# Patient Record
Sex: MALE | Race: BLACK OR AFRICAN AMERICAN | Employment: FULL TIME | URBAN - METROPOLITAN AREA
[De-identification: names, ages, dates, MRNs, and addresses within clinical notes are randomized per-mention and may not be internally consistent; named-entity substitution may affect disease eponyms.]

---

## 2019-01-29 ENCOUNTER — HOSPITAL ENCOUNTER (EMERGENCY)
Age: 39
Discharge: HOME | End: 2019-01-29
Payer: COMMERCIAL

## 2019-01-29 VITALS
WEIGHT: 270 LBS | HEIGHT: 73 IN | TEMPERATURE: 99.5 F | OXYGEN SATURATION: 93 % | HEART RATE: 69 BPM | DIASTOLIC BLOOD PRESSURE: 90 MMHG | SYSTOLIC BLOOD PRESSURE: 107 MMHG | RESPIRATION RATE: 18 BRPM | BODY MASS INDEX: 35.78 KG/M2

## 2019-01-29 DIAGNOSIS — R10.9 ABDOMINAL CRAMPING: ICD-10-CM

## 2019-01-29 DIAGNOSIS — R11.2 NAUSEA VOMITING AND DIARRHEA: Primary | ICD-10-CM

## 2019-01-29 DIAGNOSIS — R19.7 NAUSEA VOMITING AND DIARRHEA: Primary | ICD-10-CM

## 2019-01-29 LAB
ALBUMIN SERPL-MCNC: 4.4 GM/DL (ref 3.4–5)
ALP BLD-CCNC: 62 IU/L (ref 40–129)
ALT SERPL-CCNC: 32 U/L (ref 10–40)
ANION GAP SERPL CALCULATED.3IONS-SCNC: 10 MMOL/L (ref 4–16)
ANISOCYTOSIS: ABNORMAL
AST SERPL-CCNC: 24 IU/L (ref 15–37)
BASOPHILS ABSOLUTE: 0 K/CU MM
BASOPHILS RELATIVE PERCENT: 0.1 % (ref 0–1)
BILIRUB SERPL-MCNC: 0.5 MG/DL (ref 0–1)
BUN BLDV-MCNC: 13 MG/DL (ref 6–23)
CALCIUM SERPL-MCNC: 9.4 MG/DL (ref 8.3–10.6)
CHLORIDE BLD-SCNC: 101 MMOL/L (ref 99–110)
CO2: 27 MMOL/L (ref 21–32)
CREAT SERPL-MCNC: 1 MG/DL (ref 0.9–1.3)
DIFFERENTIAL TYPE: ABNORMAL
EOSINOPHILS ABSOLUTE: 0.1 K/CU MM
EOSINOPHILS RELATIVE PERCENT: 1.8 % (ref 0–3)
GFR AFRICAN AMERICAN: >60 ML/MIN/1.73M2
GFR NON-AFRICAN AMERICAN: >60 ML/MIN/1.73M2
GLUCOSE BLD-MCNC: 103 MG/DL (ref 70–99)
HCT VFR BLD CALC: 40.4 % (ref 42–52)
HEMOGLOBIN: 12.2 GM/DL (ref 13.5–18)
HYPOCHROMIA: ABNORMAL
IMMATURE NEUTROPHIL %: 0.4 % (ref 0–0.43)
LIPASE: 20 IU/L (ref 13–60)
LYMPHOCYTES ABSOLUTE: 0.8 K/CU MM
LYMPHOCYTES RELATIVE PERCENT: 11.4 % (ref 24–44)
MCH RBC QN AUTO: 20.9 PG (ref 27–31)
MCHC RBC AUTO-ENTMCNC: 30.2 % (ref 32–36)
MCV RBC AUTO: 69.1 FL (ref 78–100)
MICROCYTES: ABNORMAL
MONOCYTES ABSOLUTE: 0.6 K/CU MM
MONOCYTES RELATIVE PERCENT: 8.7 % (ref 0–4)
NUCLEATED RBC %: 0 %
OVALOCYTES: ABNORMAL
PDW BLD-RTO: 16.2 % (ref 11.7–14.9)
PLATELET # BLD: 336 K/CU MM (ref 140–440)
PLT MORPHOLOGY: ABNORMAL
PMV BLD AUTO: 9.7 FL (ref 7.5–11.1)
POLYCHROMASIA: ABNORMAL
POTASSIUM SERPL-SCNC: 3.9 MMOL/L (ref 3.5–5.1)
RAPID INFLUENZA  B AGN: NEGATIVE
RAPID INFLUENZA A AGN: NEGATIVE
RBC # BLD: 5.85 M/CU MM (ref 4.6–6.2)
SEGMENTED NEUTROPHILS ABSOLUTE COUNT: 5.2 K/CU MM
SEGMENTED NEUTROPHILS RELATIVE PERCENT: 77.6 % (ref 36–66)
SODIUM BLD-SCNC: 138 MMOL/L (ref 135–145)
TARGET CELLS: ABNORMAL
TOTAL IMMATURE NEUTOROPHIL: 0.03 K/CU MM
TOTAL NUCLEATED RBC: 0 K/CU MM
TOTAL PROTEIN: 8 GM/DL (ref 6.4–8.2)
WBC # BLD: 6.7 K/CU MM (ref 4–10.5)

## 2019-01-29 PROCEDURE — 83690 ASSAY OF LIPASE: CPT

## 2019-01-29 PROCEDURE — 80053 COMPREHEN METABOLIC PANEL: CPT

## 2019-01-29 PROCEDURE — 6360000002 HC RX W HCPCS: Performed by: PHYSICIAN ASSISTANT

## 2019-01-29 PROCEDURE — 2580000003 HC RX 258: Performed by: PHYSICIAN ASSISTANT

## 2019-01-29 PROCEDURE — 87804 INFLUENZA ASSAY W/OPTIC: CPT

## 2019-01-29 PROCEDURE — 85025 COMPLETE CBC W/AUTO DIFF WBC: CPT

## 2019-01-29 PROCEDURE — 96374 THER/PROPH/DIAG INJ IV PUSH: CPT

## 2019-01-29 PROCEDURE — 36415 COLL VENOUS BLD VENIPUNCTURE: CPT

## 2019-01-29 PROCEDURE — 96361 HYDRATE IV INFUSION ADD-ON: CPT

## 2019-01-29 PROCEDURE — 99283 EMERGENCY DEPT VISIT LOW MDM: CPT

## 2019-01-29 RX ORDER — FAMOTIDINE 20 MG/1
20 TABLET, FILM COATED ORAL 2 TIMES DAILY
Qty: 20 TABLET | Refills: 0 | Status: SHIPPED | OUTPATIENT
Start: 2019-01-29 | End: 2019-03-12

## 2019-01-29 RX ORDER — 0.9 % SODIUM CHLORIDE 0.9 %
1000 INTRAVENOUS SOLUTION INTRAVENOUS ONCE
Status: COMPLETED | OUTPATIENT
Start: 2019-01-29 | End: 2019-01-29

## 2019-01-29 RX ORDER — ONDANSETRON 2 MG/ML
4 INJECTION INTRAMUSCULAR; INTRAVENOUS EVERY 30 MIN PRN
Status: DISCONTINUED | OUTPATIENT
Start: 2019-01-29 | End: 2019-01-29 | Stop reason: HOSPADM

## 2019-01-29 RX ORDER — ONDANSETRON 4 MG/1
4 TABLET, FILM COATED ORAL EVERY 8 HOURS PRN
Qty: 10 TABLET | Refills: 0 | Status: SHIPPED | OUTPATIENT
Start: 2019-01-29 | End: 2019-03-12

## 2019-01-29 RX ORDER — DICYCLOMINE HYDROCHLORIDE 10 MG/ML
20 INJECTION INTRAMUSCULAR ONCE
Status: COMPLETED | OUTPATIENT
Start: 2019-01-29 | End: 2019-01-29

## 2019-01-29 RX ORDER — LOPERAMIDE HYDROCHLORIDE 2 MG/1
2 CAPSULE ORAL 4 TIMES DAILY PRN
Qty: 12 CAPSULE | Refills: 0 | Status: SHIPPED | OUTPATIENT
Start: 2019-01-29 | End: 2019-02-01

## 2019-01-29 RX ORDER — DICYCLOMINE HYDROCHLORIDE 10 MG/1
10 CAPSULE ORAL
Qty: 20 CAPSULE | Refills: 0 | Status: SHIPPED | OUTPATIENT
Start: 2019-01-29 | End: 2019-03-12

## 2019-01-29 RX ADMIN — DICYCLOMINE HYDROCHLORIDE 20 MG: 20 INJECTION, SOLUTION INTRAMUSCULAR at 11:21

## 2019-01-29 RX ADMIN — SODIUM CHLORIDE 1000 ML: 9 INJECTION, SOLUTION INTRAVENOUS at 11:21

## 2019-01-29 ASSESSMENT — PAIN SCALES - GENERAL: PAINLEVEL_OUTOF10: 6

## 2019-01-29 ASSESSMENT — PAIN DESCRIPTION - PAIN TYPE: TYPE: ACUTE PAIN

## 2019-01-29 ASSESSMENT — PAIN DESCRIPTION - LOCATION: LOCATION: ABDOMEN

## 2019-01-29 ASSESSMENT — PAIN DESCRIPTION - ORIENTATION: ORIENTATION: MID

## 2019-01-29 ASSESSMENT — PAIN DESCRIPTION - DESCRIPTORS: DESCRIPTORS: TIGHTNESS

## 2019-03-12 ENCOUNTER — OFFICE VISIT (OUTPATIENT)
Dept: FAMILY MEDICINE CLINIC | Age: 39
End: 2019-03-12
Payer: COMMERCIAL

## 2019-03-12 VITALS
SYSTOLIC BLOOD PRESSURE: 128 MMHG | BODY MASS INDEX: 37.35 KG/M2 | DIASTOLIC BLOOD PRESSURE: 79 MMHG | HEART RATE: 62 BPM | HEIGHT: 73 IN | OXYGEN SATURATION: 98 % | WEIGHT: 281.8 LBS

## 2019-03-12 DIAGNOSIS — Z13.220 LIPID SCREENING: ICD-10-CM

## 2019-03-12 DIAGNOSIS — D50.8 OTHER IRON DEFICIENCY ANEMIA: ICD-10-CM

## 2019-03-12 DIAGNOSIS — R53.83 OTHER FATIGUE: ICD-10-CM

## 2019-03-12 DIAGNOSIS — F17.200 TOBACCO USE DISORDER: ICD-10-CM

## 2019-03-12 DIAGNOSIS — Z13.1 DIABETES MELLITUS SCREENING: ICD-10-CM

## 2019-03-12 DIAGNOSIS — M25.512 ACUTE PAIN OF LEFT SHOULDER: Primary | ICD-10-CM

## 2019-03-12 PROBLEM — D64.9 ABSOLUTE ANEMIA: Status: ACTIVE | Noted: 2019-03-12

## 2019-03-12 LAB
CHOLESTEROL, TOTAL: 195 MG/DL (ref 0–199)
FERRITIN: 142.2 NG/ML (ref 30–400)
FOLATE: 5.38 NG/ML (ref 4.78–24.2)
HDLC SERPL-MCNC: 91 MG/DL (ref 40–60)
IRON SATURATION: 36 % (ref 20–50)
IRON: 121 UG/DL (ref 59–158)
LDL CHOLESTEROL CALCULATED: 92 MG/DL
T4 FREE: 1 NG/DL (ref 0.9–1.8)
TOTAL IRON BINDING CAPACITY: 332 UG/DL (ref 260–445)
TRIGL SERPL-MCNC: 61 MG/DL (ref 0–150)
TSH SERPL DL<=0.05 MIU/L-ACNC: 1.05 UIU/ML (ref 0.27–4.2)
VITAMIN B-12: 496 PG/ML (ref 211–911)
VLDLC SERPL CALC-MCNC: 12 MG/DL

## 2019-03-12 PROCEDURE — G8417 CALC BMI ABV UP PARAM F/U: HCPCS | Performed by: FAMILY MEDICINE

## 2019-03-12 PROCEDURE — 99203 OFFICE O/P NEW LOW 30 MIN: CPT | Performed by: FAMILY MEDICINE

## 2019-03-12 PROCEDURE — G8484 FLU IMMUNIZE NO ADMIN: HCPCS | Performed by: FAMILY MEDICINE

## 2019-03-12 PROCEDURE — 36415 COLL VENOUS BLD VENIPUNCTURE: CPT | Performed by: FAMILY MEDICINE

## 2019-03-12 PROCEDURE — G8427 DOCREV CUR MEDS BY ELIG CLIN: HCPCS | Performed by: FAMILY MEDICINE

## 2019-03-12 PROCEDURE — 4004F PT TOBACCO SCREEN RCVD TLK: CPT | Performed by: FAMILY MEDICINE

## 2019-03-12 SDOH — HEALTH STABILITY: MENTAL HEALTH: HOW OFTEN DO YOU HAVE A DRINK CONTAINING ALCOHOL?: 2-3 TIMES A WEEK

## 2019-03-12 SDOH — HEALTH STABILITY: MENTAL HEALTH: HOW MANY STANDARD DRINKS CONTAINING ALCOHOL DO YOU HAVE ON A TYPICAL DAY?: 5 OR 6

## 2019-03-12 ASSESSMENT — ENCOUNTER SYMPTOMS
BACK PAIN: 0
COUGH: 0
CONSTIPATION: 0
CHEST TIGHTNESS: 0
ABDOMINAL PAIN: 0
SINUS PRESSURE: 0
DIARRHEA: 0
SHORTNESS OF BREATH: 0
WHEEZING: 0
SORE THROAT: 0

## 2019-03-12 ASSESSMENT — PATIENT HEALTH QUESTIONNAIRE - PHQ9
2. FEELING DOWN, DEPRESSED OR HOPELESS: 0
SUM OF ALL RESPONSES TO PHQ QUESTIONS 1-9: 0
1. LITTLE INTEREST OR PLEASURE IN DOING THINGS: 0
SUM OF ALL RESPONSES TO PHQ9 QUESTIONS 1 & 2: 0
SUM OF ALL RESPONSES TO PHQ QUESTIONS 1-9: 0

## 2019-03-13 LAB
ESTIMATED AVERAGE GLUCOSE: 108.3 MG/DL
HBA1C MFR BLD: 5.4 %

## 2019-04-01 ENCOUNTER — TELEPHONE (OUTPATIENT)
Dept: FAMILY MEDICINE CLINIC | Age: 39
End: 2019-04-01

## 2019-06-12 ENCOUNTER — TELEPHONE (OUTPATIENT)
Dept: FAMILY MEDICINE CLINIC | Age: 39
End: 2019-06-12

## 2019-06-12 NOTE — TELEPHONE ENCOUNTER
6/12/19 patient was a no call no show for todays appt, called patient to reschedule number disconnect.  First no show( Letter Mailed)(SAMINA)

## 2021-06-25 ENCOUNTER — APPOINTMENT (OUTPATIENT)
Dept: GENERAL RADIOLOGY | Age: 41
End: 2021-06-25
Payer: COMMERCIAL

## 2021-06-25 ENCOUNTER — HOSPITAL ENCOUNTER (EMERGENCY)
Age: 41
Discharge: HOME | End: 2021-06-25
Attending: EMERGENCY MEDICINE
Payer: COMMERCIAL

## 2021-06-25 VITALS
WEIGHT: 280 LBS | RESPIRATION RATE: 15 BRPM | HEART RATE: 88 BPM | OXYGEN SATURATION: 98 % | HEIGHT: 71 IN | SYSTOLIC BLOOD PRESSURE: 142 MMHG | TEMPERATURE: 98.4 F | BODY MASS INDEX: 39.2 KG/M2 | DIASTOLIC BLOOD PRESSURE: 86 MMHG

## 2021-06-25 DIAGNOSIS — M25.562 LEFT KNEE PAIN, UNSPECIFIED CHRONICITY: Primary | ICD-10-CM

## 2021-06-25 PROCEDURE — 73560 X-RAY EXAM OF KNEE 1 OR 2: CPT

## 2021-06-25 PROCEDURE — 6370000000 HC RX 637 (ALT 250 FOR IP): Performed by: EMERGENCY MEDICINE

## 2021-06-25 PROCEDURE — 99285 EMERGENCY DEPT VISIT HI MDM: CPT

## 2021-06-25 RX ORDER — LIDOCAINE 50 MG/G
1 PATCH TOPICAL DAILY
Qty: 10 PATCH | Refills: 0 | Status: SHIPPED | OUTPATIENT
Start: 2021-06-25 | End: 2021-07-05

## 2021-06-25 RX ORDER — IBUPROFEN 600 MG/1
600 TABLET ORAL ONCE
Status: COMPLETED | OUTPATIENT
Start: 2021-06-25 | End: 2021-06-25

## 2021-06-25 RX ORDER — IBUPROFEN 800 MG/1
800 TABLET ORAL
Qty: 90 TABLET | Refills: 0 | Status: SHIPPED | OUTPATIENT
Start: 2021-06-25 | End: 2021-10-31 | Stop reason: SDUPTHER

## 2021-06-25 RX ORDER — METHOCARBAMOL 750 MG/1
1500 TABLET, FILM COATED ORAL ONCE
Status: COMPLETED | OUTPATIENT
Start: 2021-06-25 | End: 2021-06-25

## 2021-06-25 RX ORDER — HYDROCODONE BITARTRATE AND ACETAMINOPHEN 5; 325 MG/1; MG/1
1 TABLET ORAL ONCE
Status: COMPLETED | OUTPATIENT
Start: 2021-06-25 | End: 2021-06-25

## 2021-06-25 RX ORDER — METHOCARBAMOL 500 MG/1
500 TABLET, FILM COATED ORAL 4 TIMES DAILY PRN
Qty: 40 TABLET | Refills: 0 | Status: SHIPPED | OUTPATIENT
Start: 2021-06-25 | End: 2021-07-05

## 2021-06-25 RX ADMIN — HYDROCODONE BITARTRATE AND ACETAMINOPHEN 1 TABLET: 5; 325 TABLET ORAL at 21:00

## 2021-06-25 RX ADMIN — IBUPROFEN 600 MG: 600 TABLET, FILM COATED ORAL at 21:00

## 2021-06-25 RX ADMIN — METHOCARBAMOL TABLETS 1500 MG: 750 TABLET, COATED ORAL at 21:00

## 2021-06-25 ASSESSMENT — PAIN SCALES - GENERAL
PAINLEVEL_OUTOF10: 8
PAINLEVEL_OUTOF10: 10

## 2021-06-26 NOTE — ED TRIAGE NOTES
Pt presents to the ED c/o left knee pain for the past two weeks. Pt is alert and oriented. Pt thinks he has bursitis.

## 2021-06-26 NOTE — ED PROVIDER NOTES
wound.   Neurological: Negative for dizziness, facial asymmetry, light-headedness, numbness and headaches. Psychiatric/Behavioral: Negative for confusion. Except as noted above the remainder of the review of systems was reviewed and negative. PAST MEDICAL HISTORY   History reviewed. No pertinent past medical history. Prior to Admission medications    Medication Sig Start Date End Date Taking? Authorizing Provider   ibuprofen (ADVIL;MOTRIN) 800 MG tablet Take 1 tablet by mouth 3 times daily (with meals) 6/25/21  Yes Jem Katz MD   methocarbamol (ROBAXIN) 500 MG tablet Take 1 tablet by mouth 4 times daily as needed (for pain/spasms) 6/25/21 7/5/21 Yes Jem Katz MD   lidocaine (LIDODERM) 5 % Place 1 patch onto the skin daily for 10 days 12 hours on, 12 hours off. 6/25/21 7/5/21 Yes Jem Katz MD        Patient Active Problem List   Diagnosis    Absolute anemia    Tobacco use disorder         SURGICAL HISTORY     History reviewed. No pertinent surgical history. CURRENT MEDICATIONS       Discharge Medication List as of 6/25/2021  9:53 PM          ALLERGIES     Patient has no known allergies. FAMILY HISTORY       Family History   Problem Relation Age of Onset    Depression Mother     Diabetes Father     High Blood Pressure Father     Heart Disease Father     Glaucoma Father     Asthma Brother           SOCIAL HISTORY       Social History     Socioeconomic History    Marital status: Single     Spouse name: None    Number of children: None    Years of education: None    Highest education level: None   Occupational History    None   Tobacco Use    Smoking status: Current Every Day Smoker     Packs/day: 0.50     Types: Cigarettes    Smokeless tobacco: Never Used   Substance and Sexual Activity    Alcohol use:  Yes    Drug use: Yes     Types: Marijuana    Sexual activity: Yes     Partners: Female   Other Topics Concern    None   Social History Narrative  None     Social Determinants of Health     Financial Resource Strain:     Difficulty of Paying Living Expenses:    Food Insecurity:     Worried About Running Out of Food in the Last Year:     [address] in the Last Year:    Transportation Needs:     Lack of Transportation (Medical):  Lack of Transportation (Non-Medical):    Physical Activity:     Days of Exercise per Week:     Minutes of Exercise per Session:    Stress:     Feeling of Stress :    Social Connections:     Frequency of Communication with Friends and Family:     Frequency of Social Gatherings with Friends and Family:     Attends Islam Services:     Active Member of Clubs or Organizations:     Attends Club or Organization Meetings:     Marital Status:    Intimate Partner Violence:     Fear of Current or Ex-Partner:     Emotionally Abused:     Physically Abused:     Sexually Abused:        SCREENINGS    Sandee Coma Scale  Eye Opening: Spontaneous  Best Verbal Response: Oriented  Best Motor Response: Obeys commands  Alma Coma Scale Score: 15          PHYSICAL EXAM    (up to 7 for level 4, 8 or more for level 5)     ED Triage Vitals   BP Temp Temp src Pulse Resp SpO2 Height Weight   -- -- -- -- -- -- -- --       Physical Exam  Vitals reviewed. Constitutional:       Appearance: He is not ill-appearing or toxic-appearing. HENT:      Head: Normocephalic and atraumatic. Nose: No congestion or rhinorrhea. Mouth/Throat:      Mouth: Mucous membranes are moist.      Pharynx: No oropharyngeal exudate or posterior oropharyngeal erythema. Eyes:      General:         Right eye: No discharge. Left eye: No discharge. Extraocular Movements: Extraocular movements intact. Pupils: Pupils are equal, round, and reactive to light. Cardiovascular:      Rate and Rhythm: Normal rate. Heart sounds: No friction rub. No gallop. Pulmonary:      Effort: Pulmonary effort is normal. No respiratory distress. Chest:      Chest wall: No tenderness. Abdominal:      Palpations: Abdomen is soft. Tenderness: There is no abdominal tenderness. There is no guarding. Musculoskeletal:         General: Tenderness present. No signs of injury. Normal range of motion. Cervical back: Normal range of motion and neck supple. No rigidity or tenderness. Comments: Mild diffuse tenderness at left knee; no remarkable swelling or effusion, no overlying erythema or warmth or skin changes, full range of motion at left knee  Left lower extremity neurovascular intact with 2+ DP and PT pulses. Skin:     General: Skin is warm. Capillary Refill: Capillary refill takes less than 2 seconds. Neurological:      General: No focal deficit present. Mental Status: He is alert and oriented to person, place, and time. Mental status is at baseline. DIAGNOSTIC RESULTS     Labs Reviewed - No data to display       RADIOLOGY:     Non-plain film images such as CT, Ultrasound and MRI are read by the radiologist. Plain radiographic images are visualized and preliminarily interpreted by the emergency physician. Interpretation per the Radiologist below, if available at the time of this note:    XR KNEE LEFT (1-2 VIEWS)   Final Result   No acute abnormality of the knee. ED BEDSIDE ULTRASOUND:   Performed by ED Physician Jayne Traore MD       LABS:  Labs Reviewed - No data to display    All other labs were within normal range or not returned as of this dictation. EMERGENCY DEPARTMENT COURSE and DIFFERENTIAL DIAGNOSIS/MDM:   Vitals:    Vitals:    06/25/21 2130   BP: (!) 142/86   Pulse: 88   Resp: 15   Temp: 98.4 °F (36.9 °C)   TempSrc: Oral   SpO2: 98%   Weight: 280 lb (127 kg)   Height: 5' 11\" (1.803 m)           MDM  Number of Diagnoses or Management Options  Left knee pain, unspecified chronicity  Diagnosis management comments: 68-year-old male presents complaining of left knee pain.   He has had several weeks of pain in the knee. Denies remarkable trauma or injury. No history of gout. Is having no skin changes. No fevers or chills or constitutional infectious symptoms. Is good range of motion of the knee. He is ambulatory. Presents with elevated blood pressure. Vitals otherwise unremarkable. Physical exam is overall nonacute. Have low suspicion for etiology like septic joint. Did obtain x-ray of the knee which show some arthritis was otherwise nonacute peer results discussed with patient. He will continue symptomatic measures at home. He will follow-up outpatient. Did discuss that he might ultimately see a forced medicine orthopedic physician for further management. He is discharged home in stable condition. Return precautions for worsening concerning signs are discussed. `      -  Patient seen and evaluated in the emergency department. -  Triage and nursing notes reviewed and incorporated. -  Old chart records reviewed and incorporated. -  Work-up included:  See above  -  Results discussed with patient. CONSULTS:  None    PROCEDURES:  None performed unless otherwise noted below     Procedures        FINAL IMPRESSION      1.  Left knee pain, unspecified chronicity          DISPOSITION/PLAN   DISPOSITION Decision To Discharge 06/25/2021 09:37:40 PM      PATIENT REFERRED TO:  Debby Barahona MD  [address] [place] [address] [place]  [phone]    Schedule an appointment as soon as possible for a visit in 1 week      [address] [facility] [address] [facility], [address]  [phone]      As needed, please follow-up with orthopedic surgery as needed      DISCHARGE MEDICATIONS:  Discharge Medication List as of 6/25/2021  9:53 PM      START taking these medications    Details   ibuprofen (ADVIL;MOTRIN) 800 MG tablet Take 1 tablet by mouth 3 times daily (with

## 2021-06-29 ASSESSMENT — ENCOUNTER SYMPTOMS
BACK PAIN: 0
NAUSEA: 0
SORE THROAT: 0
COUGH: 0
SHORTNESS OF BREATH: 0
RHINORRHEA: 0
EYE PAIN: 0
ABDOMINAL PAIN: 0
VOMITING: 0
EYE DISCHARGE: 0

## 2021-10-31 ENCOUNTER — HOSPITAL ENCOUNTER (EMERGENCY)
Age: 41
Discharge: HOME | End: 2021-10-31
Attending: EMERGENCY MEDICINE
Payer: COMMERCIAL

## 2021-10-31 ENCOUNTER — HOSPITAL ENCOUNTER (EMERGENCY)
Age: 41
Discharge: LEFT BEFORE DISCHARGE | End: 2021-10-31
Attending: EMERGENCY MEDICINE
Payer: COMMERCIAL

## 2021-10-31 ENCOUNTER — APPOINTMENT (OUTPATIENT)
Dept: GENERAL RADIOLOGY | Age: 41
End: 2021-10-31
Payer: COMMERCIAL

## 2021-10-31 VITALS
OXYGEN SATURATION: 99 % | HEART RATE: 81 BPM | HEIGHT: 72 IN | DIASTOLIC BLOOD PRESSURE: 100 MMHG | TEMPERATURE: 98.3 F | WEIGHT: 290 LBS | BODY MASS INDEX: 39.28 KG/M2 | SYSTOLIC BLOOD PRESSURE: 174 MMHG | RESPIRATION RATE: 18 BRPM

## 2021-10-31 VITALS
TEMPERATURE: 98.2 F | RESPIRATION RATE: 20 BRPM | HEART RATE: 97 BPM | DIASTOLIC BLOOD PRESSURE: 95 MMHG | SYSTOLIC BLOOD PRESSURE: 164 MMHG | OXYGEN SATURATION: 98 % | BODY MASS INDEX: 39.96 KG/M2 | HEIGHT: 72 IN | WEIGHT: 295 LBS

## 2021-10-31 DIAGNOSIS — S96.912A LEFT ANKLE STRAIN, INITIAL ENCOUNTER: ICD-10-CM

## 2021-10-31 DIAGNOSIS — R03.0 ELEVATED BLOOD PRESSURE READING: ICD-10-CM

## 2021-10-31 DIAGNOSIS — S93.412A SPRAIN OF CALCANEOFIBULAR LIGAMENT OF LEFT ANKLE, INITIAL ENCOUNTER: Primary | ICD-10-CM

## 2021-10-31 PROCEDURE — 73610 X-RAY EXAM OF ANKLE: CPT

## 2021-10-31 PROCEDURE — 6370000000 HC RX 637 (ALT 250 FOR IP): Performed by: EMERGENCY MEDICINE

## 2021-10-31 PROCEDURE — 4500000002 HC ER NO CHARGE

## 2021-10-31 PROCEDURE — 99284 EMERGENCY DEPT VISIT MOD MDM: CPT

## 2021-10-31 RX ORDER — HYDROCODONE BITARTRATE AND ACETAMINOPHEN 5; 325 MG/1; MG/1
1 TABLET ORAL ONCE
Status: COMPLETED | OUTPATIENT
Start: 2021-10-31 | End: 2021-10-31

## 2021-10-31 RX ORDER — IBUPROFEN 800 MG/1
800 TABLET ORAL
Qty: 90 TABLET | Refills: 0 | Status: SHIPPED | OUTPATIENT
Start: 2021-10-31 | End: 2022-01-05 | Stop reason: SDUPTHER

## 2021-10-31 RX ORDER — HYDROCODONE BITARTRATE AND ACETAMINOPHEN 5; 325 MG/1; MG/1
1 TABLET ORAL EVERY 8 HOURS PRN
Qty: 9 TABLET | Refills: 0 | Status: SHIPPED | OUTPATIENT
Start: 2021-10-31 | End: 2021-11-03

## 2021-10-31 RX ADMIN — HYDROCODONE BITARTRATE AND ACETAMINOPHEN 1 TABLET: 5; 325 TABLET ORAL at 03:28

## 2021-10-31 ASSESSMENT — PAIN DESCRIPTION - ONSET: ONSET: ON-GOING

## 2021-10-31 ASSESSMENT — PAIN DESCRIPTION - LOCATION
LOCATION: ANKLE
LOCATION: ANKLE

## 2021-10-31 ASSESSMENT — PAIN DESCRIPTION - ORIENTATION
ORIENTATION: LEFT
ORIENTATION: LEFT

## 2021-10-31 ASSESSMENT — PAIN DESCRIPTION - PAIN TYPE
TYPE: ACUTE PAIN
TYPE: ACUTE PAIN

## 2021-10-31 ASSESSMENT — PAIN SCALES - GENERAL
PAINLEVEL_OUTOF10: 9

## 2021-10-31 ASSESSMENT — PAIN - FUNCTIONAL ASSESSMENT: PAIN_FUNCTIONAL_ASSESSMENT: PREVENTS OR INTERFERES SOME ACTIVE ACTIVITIES AND ADLS

## 2021-10-31 ASSESSMENT — PAIN DESCRIPTION - PROGRESSION: CLINICAL_PROGRESSION: NOT CHANGED

## 2021-10-31 ASSESSMENT — PAIN DESCRIPTION - FREQUENCY: FREQUENCY: CONTINUOUS

## 2021-10-31 ASSESSMENT — PAIN DESCRIPTION - DESCRIPTORS: DESCRIPTORS: ACHING

## 2021-10-31 NOTE — ED PROVIDER NOTES
Emergency Department Encounter    Patient: Leonides Mendiola  MRN: [id number]  : 1980  Date of Evaluation: 10/31/2021  ED Provider:  Shyann Alcazar MD      Triage Chief Complaint:   Ankle Pain (rolled left ankle 4 days ago )      [place]:  Leonides Mendiola is a 41 y.o. male that presents to the emergency department with persistent pain in the left ankle. Patient reports that about 4 days ago he \"rolled\" his left ankle. The ankle was inverted. He was seen at a local urgent care soon after, and x-rays at that time showed no visible fractures. He was provided an Ace wrap and crutches. He has been taking over-the-counter ibuprofen. He has persistent aching quality pain. Pain is moderate to severe. Pain is generally constant in timing. Pain worsens with weightbearing and movement. After the initial injury, pain was mostly towards the lateral malleolus. Pain is radiating towards \"the Achilles\" now. He denies prior injury. Patient presented initially to [facility] this evening, and x-rays were obtained there. Due to prolonged wait times, they left and presented to this facility. Patient was brought to the emergency department by his significant other. Patient reports no other particular provocative or alleviating factors. ROS - see HPI, below listed is current ROS at time of my eval:  CONSTITUTIONAL: No fevers. MUSCULOSKELETAL: As above. NEUROLOGICAL: No focal weakness, numbness, or tingling. SKIN: No rashes or other lesions reported. No yellowing of the skin. History reviewed. No pertinent past medical history. History reviewed. No pertinent surgical history.   Family History   Problem Relation Age of Onset    Depression Mother     Diabetes Father     High Blood Pressure Father     Heart Disease Father     Glaucoma Father     Asthma Brother      Social History     Socioeconomic History    Marital status: Single     Spouse name: Not on file    Number of children: Not on file  Years of education: Not on file    Highest education level: Not on file   Occupational History    Not on file   Tobacco Use    Smoking status: Current Every Day Smoker     Packs/day: 0.50     Types: Cigarettes    Smokeless tobacco: Never Used   Substance and Sexual Activity    Alcohol use: Yes    Drug use: Not Currently     Types: Marijuana    Sexual activity: Yes     Partners: Female   Other Topics Concern    Not on file   Social History Narrative    Not on file     Social Determinants of Health     Financial Resource Strain:     Difficulty of Paying Living Expenses:    Food Insecurity:     Worried About Running Out of Food in the Last Year:     [address] in the Last Year:    Transportation Needs:     Lack of Transportation (Medical):  Lack of Transportation (Non-Medical):    Physical Activity:     Days of Exercise per Week:     Minutes of Exercise per Session:    Stress:     Feeling of Stress :    Social Connections:     Frequency of Communication with Friends and Family:     Frequency of Social Gatherings with Friends and Family:     Attends Lutheran Services:     Active Member of Clubs or Organizations:     Attends Club or Organization Meetings:     Marital Status:    Intimate Partner Violence:     Fear of Current or Ex-Partner:     Emotionally Abused:     Physically Abused:     Sexually Abused:      No current facility-administered medications for this encounter. Current Outpatient Medications   Medication Sig Dispense Refill    ibuprofen (ADVIL;MOTRIN) 800 MG tablet Take 1 tablet by mouth 3 times daily (with meals) 90 tablet 0    HYDROcodone-acetaminophen (NORCO) 5-325 MG per tablet Take 1 tablet by mouth every 8 hours as needed for Pain for up to 3 days. Intended supply: 3 days.  Take lowest dose possible to manage pain 9 tablet 0     No Known Allergies    Nursing Notes Reviewed    Physical Exam:  Triage VS:    ED Triage Vitals [10/31/21 0300]   [facility] Vitals [facility] BP (!) 174/100      Pulse 81      Resp 18      Temp 98.3 °F (36.8 °C)      Temp Source Oral      SpO2 99 %      Weight 290 lb (131.5 kg)      Height 6' (1.829 m)      Head Circumference       Peak Flow       Pain Score       Pain Loc       Pain Edu? Excl. in [address]? My pulse ox interpretation is -normal on room air    GENERAL: Patient is awake, alert, and oriented appropriately. Patient is resting comfortably in a still position on the exam table. Patient speaking in full and complete sentences. Well-nourished and well-developed. HEENT: Normocephalic and atraumatic. Pupils equal, round, and reactive to light. No redness or matting. Bilateral external ears are unremarkable. Nasal mucosa is pink without purulence. Oral mucosa is moist and pink. NECK: Supple with normal range of motion. RESPIRATORY: Normal respirations. No wheeze or stridor. CARDIOVASCULAR: Regular rate and rhythm. No central or peripheral cyanosis. GASTROINTESTINAL: Soft, nontender, and nondistended. NEUROLOGICAL: Awake, alert and oriented x 3. Cranial nerves III through XII are grossly intact as tested without facial droop or dermatomal paresthesias. Of note, forehead wrinkles are symmetric and intact. Conjugate gaze without entrapment. No asymmetry of the corners of the mouth or nasolabial folds. No gross motor or cerebellar deficits. BACK/MUSCULOSKELETAL: Focal examination of the left lower extremity reveals somewhat generalized swelling of the ankle and proximal midfoot. There is point tenderness at the tip of the lateral malleolus. There is no specific tenderness of the medial malleolus more distal midfoot or fifth metatarsal.  There is plantar flexion with compression of the calf musculature. There is no tenderness along the more proximal fibula and tibia. 2+ posterior tibial and dorsalis pedis pulses. Soft compartments. No asymmetric edema, Karn Rebekah' sign, or cords.     SKIN: Normal tone for ethnicity. Normal turgor and brisk capillary refill peripherally. Emergency department course. Patient presented initially to [facility], and x-rays were obtained there. Patient is brought to bed one and assessed and reassessed by me. After initial evaluation, x-rays this evening are discussed. There is no apparent fracture or malalignment. We have discussed the likelihood of soft tissue injury including sprain and strain. We discussed the possibility of an occult injury such as stress fracture that may not show up on initial imaging. Patient was initially provided an Ace wrap at the local urgent care, then this does not seem to be helping. Patient will be provided a walking splint in the hopes that this will provide adequate immobilization but still allow some degree of weightbearing. We have discussed the importance of continuing rest, elevation, pain control, and orthopedic follow-up. If symptoms fail to improve, he may require further imaging such as repeat x-rays or MRI. Urgent care gave him a work note to cover him through November 04, as well. Patient is agreeable to continuing plan. We have discussed all available results. Patient is satisfied with evaluation and agreeable to recommendations. Patient has had the opportunity to ask questions, and they have been answered to the best of my ability. Instructions are given to follow-up with primary care provider for reevaluation and further testing. Very strict return and follow-up instructions are provided. Patient seen during [facility], I did don appropriate PPE during my encounters with the patient, including n95 (when appropriate) mask and eye protection as appropriate. I have reviewed and interpreted all of the currently available lab results from this visit (if applicable):  No results found for this visit on 10/31/21.      Radiographs (if obtained):  Radiologist's Report Reviewed:  XR ANKLE LEFT (MIN 3 VIEWS)    Result Date: 10/31/2021  EXAMINATION: THREE XRAY VIEWS OF THE LEFT ANKLE 10/31/2021 1:29 am COMPARISON: None. HISTORY: ORDERING SYSTEM PROVIDED HISTORY: trauma TECHNOLOGIST PROVIDED HISTORY: Reason for exam:->trauma Reason for Exam: trauma Acuity: Acute Type of Exam: Initial FINDINGS: There is no acute fracture, dislocation, or bone destruction. Mild degenerative changes are seen involving the left ankle. Calcaneal enthesopathy is noted. The ankle mortise is well maintained. No acute osseous abnormality. Medical decision making:  Patient presents to the emergency department with history and physical exam consistent with a soft tissue injury of left ankle. There is no apparent fracture or malalignment visible on initial imaging. We have discussed the possibility of a subtle, occult injury such as stress fracture or soft tissue injury that may not be visible on initial imaging. Exam does not suggest Achilles rupture, partial or complete. He does not have specific reproducible tenderness of the midfoot or fifth metatarsal, proximal fibula, or knee. I do not believe that additional imaging is emergently indicated. We have discussed initial conservative management including splinting, ice packs 20 minutes on/20 minutes off, and careful follow-up with primary care provider and orthopedic surgeon of choice. If symptoms are not improving within 7 to 10 days, repeat imaging may be necessary to look for any signs of those occult injuries. There is no evidence of compartment syndrome, venous arterial occlusion, septic arthritis, fasciitis, abscess, or other systemic illness. Patient appears generally well hydrated and nontoxic. There is no respiratory distress, hypoxia, or cyanosis. Continuing outpatient management is appropriate.     Blood pressure is elevated, but there has been no complaint of headache, chest pain or discomfort, difficulty breathing, or other symptoms to suggest endorgan injury. Recommendations are given to follow-up with primary care provider for long-term monitoring. Procedures: None. Consultations: None. Clinical Impression:  1. Sprain of calcaneofibular ligament of left ankle, initial encounter    2. Left ankle strain, initial encounter    3. Elevated blood pressure reading      Disposition referral (if applicable):  Mckenzie Sullivan DO  [address] [facility]  [address] [facility]  [phone]    Schedule an appointment as soon as possible for a visit   For orthopedic follow-up    [place] [address] [address] [facility]  [address] [place] [address]  [phone]  Go to   As needed, If symptoms worsen    Cee Leiva MD  [facility] [address] [facility]  [phone]    Schedule an appointment as soon as possible for a visit   For primary care follow-up including blood pressure management    Disposition medications (if applicable):  New Prescriptions    HYDROCODONE-ACETAMINOPHEN (NORCO) 5-325 MG PER TABLET    Take 1 tablet by mouth every 8 hours as needed for Pain for up to 3 days. Intended supply: 3 days. Take lowest dose possible to manage pain     ED Provider Disposition Time  DISPOSITION Decision To Discharge 10/31/2021 03:22:00 AM      Comment: Please note this report has been produced using speech recognition software and may contain errors related to that system including errors in grammar, punctuation, and spelling, as well as words and phrases that may be inappropriate. Efforts were made to edit the dictations.         Monica Pope MD  10/31/21 9030

## 2021-10-31 NOTE — ED TRIAGE NOTES
Seen at urgent care yesterday, xray was performed and showed no fracture. He was given crutches and note to stay off work until 11/4. Pt states since it has gotten worse and he is now no longer to put pressure on his foot. Urgent care also did not send any prescriptions in for him.

## 2021-10-31 NOTE — ED NOTES
Patient notified staff that he is leaving and going to another hospital due to times.       Teresa Bradshaw RN  10/31/21 7786

## 2021-11-03 ENCOUNTER — OFFICE VISIT (OUTPATIENT)
Dept: ORTHOPEDIC SURGERY | Age: 41
End: 2021-11-03
Payer: COMMERCIAL

## 2021-11-03 VITALS
HEIGHT: 72 IN | RESPIRATION RATE: 16 BRPM | OXYGEN SATURATION: 98 % | BODY MASS INDEX: 40.63 KG/M2 | HEART RATE: 82 BPM | WEIGHT: 300 LBS

## 2021-11-03 DIAGNOSIS — S93.492A SPRAIN OF ANTERIOR TALOFIBULAR LIGAMENT OF LEFT ANKLE, INITIAL ENCOUNTER: Primary | ICD-10-CM

## 2021-11-03 PROCEDURE — 99203 OFFICE O/P NEW LOW 30 MIN: CPT | Performed by: STUDENT IN AN ORGANIZED HEALTH CARE EDUCATION/TRAINING PROGRAM

## 2021-11-03 PROCEDURE — G8427 DOCREV CUR MEDS BY ELIG CLIN: HCPCS | Performed by: STUDENT IN AN ORGANIZED HEALTH CARE EDUCATION/TRAINING PROGRAM

## 2021-11-03 PROCEDURE — G8484 FLU IMMUNIZE NO ADMIN: HCPCS | Performed by: STUDENT IN AN ORGANIZED HEALTH CARE EDUCATION/TRAINING PROGRAM

## 2021-11-03 PROCEDURE — 4004F PT TOBACCO SCREEN RCVD TLK: CPT | Performed by: STUDENT IN AN ORGANIZED HEALTH CARE EDUCATION/TRAINING PROGRAM

## 2021-11-03 PROCEDURE — G8417 CALC BMI ABV UP PARAM F/U: HCPCS | Performed by: STUDENT IN AN ORGANIZED HEALTH CARE EDUCATION/TRAINING PROGRAM

## 2021-11-03 ASSESSMENT — ENCOUNTER SYMPTOMS
WHEEZING: 0
SHORTNESS OF BREATH: 0
DIARRHEA: 0
SORE THROAT: 0
NAUSEA: 0
COUGH: 0
VOMITING: 0
COLOR CHANGE: 0
BACK PAIN: 0

## 2021-11-03 NOTE — LETTER
[address] [facility]  [address] [facility]  Phone: [phone]  Fax: [phone]    Sung Duggan DO        November 3, 2021     Patient: Maria De Jesus Andrew   YOB: 1980   Date of Visit: 11/3/2021       To Whom It May Concern: It is my medical opinion that Shira Cruz should remain out of work until until cleared by physician, approximately in 1 month. .    If you have any questions or concerns, please don't hesitate to call.     Sincerely,        Sung Duggan DO

## 2021-11-03 NOTE — PROGRESS NOTES
Patient is a 41year old male. Patient is in the office today with left ankle injury. 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 Patient states that the injury happened 10/27/21. He is currently wearing an aircast and ambulating with crutches. Pain scale  8/10. His pain started out on the lateral side of his ankle and has progressed to the posterior. He complains of aching pain at night. He denies any previous injuries, surgeries or injections to his left ankle.    Occupation: [email or web address]

## 2021-11-03 NOTE — PATIENT INSTRUCTIONS
Walking boot and ankle brace given today  Progress weight bearing as tolerated. Follow up in one month  Ankle exercises given today    Patient Education        Ankle Sprain: Rehab Exercises  Introduction  Here are some examples of exercises for you to try. The exercises may be suggested for a condition or for rehabilitation. Start each exercise slowly. Ease off the exercises if you start to have pain. You will be told when to start these exercises and which ones will work best for you. How to do the exercises  'Alphabet' exercise    1. Trace the alphabet with your toe. This helps your ankle move in all directions. Side-to-side knee swing exercise    1. Sit in a chair with your foot flat on the floor. 2. Slowly move your knee from side to side. Keep your foot pressed flat. 3. Continue this exercise for 2 to 3 minutes. Towel curl    1. While sitting, place your foot on a towel on the floor. Scrunch the towel toward you with your toes. 2. Then use your toes to push the towel away from you. 3. To make this exercise more challenging you can put something on the other end of the towel. A can of soup is about the right weight for this. Towel stretch    1. Sit with your legs extended and knees straight. 2. Place a towel around your foot just under the toes. 3. Hold each end of the towel in each hand, with your hands above your knees. 4. Pull back with the towel so that your foot stretches toward you. 5. Hold the position for at least 15 to 30 seconds. 6. Repeat 2 to 4 times a session. Do up to 5 sessions a day. Ankle eversion exercise    1. Start by sitting with your foot flat on the floor. Push your foot outward against a wall or a piece of furniture that doesn't move. Hold for about 6 seconds, and relax. Repeat 8 to 12 times. 2. After you feel comfortable with this, try using rubber tubing looped around the outside of your feet for resistance.  Push your foot out to the side against the tubing, and then count to 10 as you slowly bring your foot back to the middle. Repeat 8 to 12 times. Isometric opposition exercises    1. While sitting, put your feet together flat on the floor. 2. Press your injured foot inward against your other foot. Hold for about 6 seconds, and relax. Repeat 8 to 12 times. 3. Then place the heel of your other foot on top of the injured one. Push down with the top heel while trying to push up with your injured foot. Hold for about 6 seconds, and relax. Repeat 8 to 12 times. Resisted ankle inversion    1. Sit on the floor with your good leg crossed over your other leg. 2. Hold both ends of an exercise band and loop the band around the inside of your affected foot. Then press your other foot against the band. 3. Keeping your legs crossed, slowly push your affected foot against the band so that foot moves away from your other foot. Then slowly relax. 4. Repeat 8 to 12 times. Resisted ankle eversion    1. Sit on the floor with your legs straight. 2. Hold both ends of an exercise band and loop the band around the outside of your affected foot. Then press your other foot against the band. 3. Keeping your leg straight, slowly push your affected foot outward against the band and away from your other foot without letting your leg rotate. Then slowly relax. 4. Repeat 8 to 12 times. Resisted ankle dorsiflexion    1. Tie the ends of an exercise band together to form a loop. Attach one end of the loop to a secure object or shut a door on it to hold it in place. (Or you can have someone hold one end of the loop to provide resistance.)  2. While sitting on the floor or in a chair, loop the other end of the band over the top of your affected foot. 3. Keeping your knee and leg straight, slowly flex your foot to pull back on the exercise band, and then slowly relax. 4. Repeat 8 to 12 times. Single-leg balance    1.  Stand on a flat surface with your arms stretched out to your sides like you are making the letter \"T. \" Then lift your good leg off the floor, bending it at the knee. If you are not steady on your feet, use one hand to hold on to a chair, counter, or wall. 2. Standing on the leg with your affected ankle, keep that knee straight. Try to balance on that leg for up to 30 seconds. Then rest for up to 10 seconds. 3. Repeat 6 to 8 times. 4. When you can balance on your affected leg for 30 seconds with your eyes open, try to balance on it with your eyes closed. 5. When you can do this exercise with your eyes closed for 30 seconds and with ease and no pain, try standing on a pillow or piece of foam, and repeat steps 1 through 4. Follow-up care is a key part of your treatment and safety. Be sure to make and go to all appointments, and call your doctor if you are having problems. It's also a good idea to know your test results and keep a list of the medicines you take. Where can you learn more? Go to https://[employer]. org and sign in to your [employer] account. Enter Dar Barnes in the [facility] box to learn more about \"Ankle Sprain: Rehab Exercises. \"     If you do not have an account, please click on the \"Sign Up Now\" link. Current as of: July 1, 2021               Content Version: 13.0  © 2006-2021 [employer]. Care instructions adapted under license by [employer]. If you have questions about a medical condition or this instruction, always ask your healthcare professional. [place] [address] any warranty or liability for your use of this information.

## 2021-11-03 NOTE — PROGRESS NOTES
11/3/2021   Chief Complaint   Patient presents with    Ankle Injury     left ankle sprain        History of Present Illness:                             Cheo Stevenson is a 41 y.o. male works as a ,  referred by emergency room for evaluation and treatment of left ankle pain. 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 He had immediate pain and inability ambulate. He was taken to the emergency room with x-rays were performed and were negative for any type of fracture but he was placed in an Aircast and given crutches to limit his weightbearing. He was also to follow-up with orthopedics and is here today for his first visit with myself. He denies any prior left ankle injury or pain. He does however have a history of significant flatfoot bilaterally that does cause occasional foot issues. The pain's location is left lateral ankle ligament. he describes the symptoms as aching, sharp and stabbing. Symptoms improve with rest. Symptoms worsen with ankle plantarflexion/dorsiflexion, weight bearing (especially stair climbing), twisting activities. Patient states he denies having sensation of instability, decreased ROM    Treatment to date has been ice, NSAID, bracing without significant relief. Patient denies prior injury to ankle, denies numbness, tingling, fever, chills. Is affecting ADLs. Pain is 8/10 at it's worst.    Outside reports reviewed: Emergency room note and imaging reviewed    Patient's medications, allergies, past medical, surgical, social and family histories were reviewed and updated as appropriate. MA HPI: Patient is a 41year old male. Patient is in the office today with left ankle injury. 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  Patient states that the injury happened 10/27/21. He is currently wearing an aircast and ambulating with crutches. Pain scale  8/10. His pain started out on the lateral side of his ankle and has progressed to the posterior. He complains of aching pain at night. He denies any previous injuries, surgeries or injections to his left ankle. Occupation: [employer] History  Patient's medications, allergies, past medical, surgical, social and family histories were reviewed and updated as appropriate. History reviewed. No pertinent past medical history. History reviewed. No pertinent surgical history. Family History   Problem Relation Age of Onset    Depression Mother     Diabetes Father     High Blood Pressure Father     Heart Disease Father     Glaucoma Father     Asthma Brother      Social History     Socioeconomic History    Marital status: Single     Spouse name: None    Number of children: None    Years of education: None    Highest education level: None   Occupational History    None   Tobacco Use    Smoking status: Current Every Day Smoker     Packs/day: 0.50     Types: Cigarettes    Smokeless tobacco: Never Used   Substance and Sexual Activity    Alcohol use: Yes    Drug use: Not Currently     Types: Marijuana Nithya Zuluaga)    Sexual activity: Yes     Partners: Female   Other Topics Concern    None   Social History Narrative    None     Social Determinants of Health     Financial Resource Strain:     Difficulty of Paying Living Expenses:    Food Insecurity:     Worried About Running Out of Food in the Last Year:     Ran Out of Food in the Last Year:    Transportation Needs:     Lack of Transportation (Medical):      Lack of Transportation (Non-Medical):    Physical Activity:     Days of Exercise per Week:     Minutes of Exercise per Session:    Stress:     Feeling of Stress :    Social Connections:     Frequency of Communication with Friends and Family:     Frequency of Social Gatherings with Friends and Family:     Attends [employer] Services:     Active Member of Clubs or Organizations:     Attends Club or Organization Meetings:     Marital Status:    Intimate Partner Violence:     Fear of Current or Ex-Partner:     Emotionally Abused:     Physically Abused:     Sexually Abused:      Current Outpatient Medications   Medication Sig Dispense Refill    ibuprofen (ADVIL;MOTRIN) 800 MG tablet Take 1 tablet by mouth 3 times daily (with meals) 90 tablet 0    HYDROcodone-acetaminophen (NORCO) 5-325 MG per tablet Take 1 tablet by mouth every 8 hours as needed for Pain for up to 3 days. Intended supply: 3 days. Take lowest dose possible to manage pain 9 tablet 0     No current facility-administered medications for this visit. No Known Allergies      Review of Systems   Constitutional: Positive for activity change. Negative for fatigue and fever. HENT: Negative for hearing loss, sneezing and sore throat. Respiratory: Negative for cough, shortness of breath and wheezing. Cardiovascular: Negative for chest pain, palpitations and leg swelling. Gastrointestinal: Negative for diarrhea, nausea and vomiting. Musculoskeletal: Positive for arthralgias, gait problem, joint swelling and myalgias. Negative for back pain. Skin: Negative for color change, pallor, rash and wound. Neurological: Positive for weakness. Negative for numbness and headaches. Psychiatric/Behavioral: Negative for agitation and confusion. The patient is not hyperactive. Examination:  General Exam:  Vitals: Pulse 82   Resp 16   Ht 6' (1.829 m)   Wt 300 lb (136.1 kg)   SpO2 98%   BMI 40.69 kg/m²    Physical Exam  Constitutional:       General: He is not in acute distress. Appearance: Normal appearance. He is not ill-appearing. Eyes:      General:         Right eye: No discharge. Left eye: No discharge. Extraocular Movements: Extraocular movements intact.    Cardiovascular: Rate and Rhythm: Normal rate. Pulmonary:      Effort: Pulmonary effort is normal. No respiratory distress. Breath sounds: No wheezing. Musculoskeletal:         General: Swelling, tenderness and signs of injury present. No deformity. Right knee: Normal.      Left knee: Normal.      Right lower leg: Normal. No edema. Left lower leg: Normal. No edema. Right ankle: Normal.      Right Achilles Tendon: Normal.      Left ankle: Swelling present. No deformity, ecchymosis or lacerations. Tenderness present over the lateral malleolus, ATF ligament, CF ligament and posterior TF ligament. No medial malleolus tenderness. Decreased range of motion. Anterior drawer test negative. Normal pulse. Left Achilles Tendon: Normal.      Right foot: Normal.      Left foot: Normal.   Skin:     General: Skin is warm and dry. Capillary Refill: Capillary refill takes less than 2 seconds. Neurological:      General: No focal deficit present. Mental Status: He is alert and oriented to person, place, and time. Sensory: No sensory deficit. Coordination: Coordination normal.      Gait: Gait normal.   Psychiatric:         Mood and Affect: Mood normal.         Behavior: Behavior normal.        LEFT Foot and Ankle Exam      INSPECTION: ALIGNMENT:    Gait: Antalgic      Alignment:     Hindfoot: Normal       Midfoot: Normal     Forefoot: Normal    Scars: None   Color: Normal     Swelling: Moderate swelling over lateral ankle ligaments    Atrophy: None Collective     Heel / Toe Walking: Unable to perform    Ankle-Hindfoot Alignment:    Good plantigrade (PG), well aligned      TENDERNESS:    Sinus tarsi: None   Anteromedial joint line: none    Syndesmosis: none   Anterolateral joint line: none    ATFL: +    Talonavicular: none     CFL: +    Anterior tibialis: none    Anterolateral gutter: none  Extensor tendons: none    Fibula: none    Peroneal tendons: none     Peroneal tubercle.  None     Medial/lateral achilles: none    Medial/lateral achilles insertion: none      Deltoid: none        Malleolus: none   Retrocalcaneal: none    PTT: none    Medial achilles: none    Navicular: none   Lateral achilles: none    Calcaneal tuberosity: none        Calcaneal cuboid: none  MT / MT heads: none     Navicular: none   Medial cord origin PF: none    Cuneiforms: none   Web space: none    Lisfranc none    Tarsal tunnel: none    Base of the fifth metatarsal: none     Tinels sign: neg        RANGE OF MOTION:  RIGHT   LEFT    STRENGTH: (Left) (* = pain)     Ankle DF/PF:    15/45    15/45    Anterior tibialis: 5/5    Eversion/Inversion:   15/25    15/25   Posterior tibialis: 5/5    Midfoot ABD/ADD:   10/10    10/10   Gastroc-soleus: 5/5    First MTP DF/PF:   60/25 60/25   Peroneals: 5/5     EHL: 5/5             FHL: 5/5        SPECIAL TESTS:    Anterior drawer:  Grade 1      (C-W contralateral side)     Inversion: 30°     Eversion 10°      Collective Instability: (Ant-post and varus-valgus)    Stable      PROVOCATIVE TESTING:    Forced DF/ER: No pain at syndesmosis. Mid-leg squeeze No pain at syndesmosis    Forced DF: No pain anterior joint line. Forced PF: No pain posterior ankle. Forced INV: Pain present laterally    Forced EV: No pain medial     Leys sign: Normal ankle plantar flexion. Resisted peroneal No subluxation or pain    1st-2nd MT toggle No pain at Lisfranc    MT-T torque No pain at Lisfranc      NEUROLOGIC TESTING:    All dermatomes foot, ankle and leg have normal sensation light touch    Ankle Reflexes 2+, symmetric     Negative Babinski and No Clonus      VASCULAR:  2+ pulses PT/DT with brisk capillary refill toes. Diagnostic testing:  X-ray images were reviewed by myself and discussed with the patient:  3 views of left ankle in a skeletally mature patient shows no acute osseous abnormalities.   Patient does have mild generative changes at the left ankle joint and significant flatfoot deformity. He also has several osteophytes including at the calcaneus. Ankle mortise is well-positioned with symmetric joint space throughout. Appropriate tib-fib overlap and clear space seen. Office Procedures:  No orders of the defined types were placed in this encounter. Assessment and Plan    A: Left ankle sprain    P:   I had a thorough discussion with the patient regarding his left ankle injury as well as treatment course. I explained at this time we will place him in a long leg walking boot and will allow him to ambulate as tolerated in the boot. I suggested that he start 25% weightbearing in the boot using his crutches and can add 25% as tolerated over the next 4 weeks. If he is able to ambulate without crutch use and just the walking boot is slow to do so. If he is able to do so for approximately 1 week he is able to attempt ambulating out of the boot at home in the lace up ankle brace that was also provided today. I also suggested he use a lace up ankle brace at night to prevent any type of ankle injury while sleeping. He will continue to ice and elevate the ankle for swelling control. He will continue to use over-the-counter anti-inflammatories for pain control as well. He was given home exercise program to work on ankle range of motion and strengthening. He will follow up in 4 weeks for reevaluation. All questions were answered and patient voices understanding.     Electronically signed by Marilee Raman DO on 11/3/2021 at 4:37 PM

## 2021-12-08 ENCOUNTER — OFFICE VISIT (OUTPATIENT)
Dept: ORTHOPEDIC SURGERY | Age: 41
End: 2021-12-08
Payer: COMMERCIAL

## 2021-12-08 VITALS
HEART RATE: 68 BPM | RESPIRATION RATE: 17 BRPM | BODY MASS INDEX: 40.63 KG/M2 | OXYGEN SATURATION: 99 % | WEIGHT: 300 LBS | HEIGHT: 72 IN

## 2021-12-08 DIAGNOSIS — S93.492A SPRAIN OF ANTERIOR TALOFIBULAR LIGAMENT OF LEFT ANKLE, INITIAL ENCOUNTER: Primary | ICD-10-CM

## 2021-12-08 PROCEDURE — 99213 OFFICE O/P EST LOW 20 MIN: CPT | Performed by: STUDENT IN AN ORGANIZED HEALTH CARE EDUCATION/TRAINING PROGRAM

## 2021-12-08 PROCEDURE — 4004F PT TOBACCO SCREEN RCVD TLK: CPT | Performed by: STUDENT IN AN ORGANIZED HEALTH CARE EDUCATION/TRAINING PROGRAM

## 2021-12-08 PROCEDURE — G8427 DOCREV CUR MEDS BY ELIG CLIN: HCPCS | Performed by: STUDENT IN AN ORGANIZED HEALTH CARE EDUCATION/TRAINING PROGRAM

## 2021-12-08 PROCEDURE — G8484 FLU IMMUNIZE NO ADMIN: HCPCS | Performed by: STUDENT IN AN ORGANIZED HEALTH CARE EDUCATION/TRAINING PROGRAM

## 2021-12-08 PROCEDURE — G8417 CALC BMI ABV UP PARAM F/U: HCPCS | Performed by: STUDENT IN AN ORGANIZED HEALTH CARE EDUCATION/TRAINING PROGRAM

## 2021-12-08 ASSESSMENT — ENCOUNTER SYMPTOMS
COUGH: 0
SORE THROAT: 0
DIARRHEA: 0
WHEEZING: 0
COLOR CHANGE: 0
BACK PAIN: 0
VOMITING: 0
NAUSEA: 0
SHORTNESS OF BREATH: 0

## 2021-12-08 NOTE — LETTER
[facility]  [address]  Phone: [phone]  Fax: [phone]    Aurelia Malone DO        December 8, 2021     Patient: Natalia Cherry   YOB: 1980   Date of Visit: 12/8/2021       To Whom It May Concern: It is my medical opinion that Carlos Sneed may return to work immediately with the following restrictions: wearing the walking boot at all times, taking breaks when needed and no stairs. If you have any questions or concerns, please don't hesitate to call.     Sincerely,        Aurelia Malone DO

## 2021-12-08 NOTE — PROGRESS NOTES
12/8/2021   Chief Complaint   Patient presents with    Ankle Pain     left        Updated HPI: Patient is here for reevaluation of his left ankle. Patient states he has been compliant in his walking boot and states he has significant improved since last being seen. He also states he has been vigorous with his home exercise program.  He notices that his swelling has significantly improved and he is able to ambulate out of the boot with minimal pain. He has no new injury or complaints last being seen. Previous HPI (11-3-2021):                           Roger Merrill is a 35 y.o. male works as a ,  referred by emergency room for evaluation and treatment of left ankle pain. 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 He had immediate pain and inability ambulate. He was taken to the emergency room with x-rays were performed and were negative for any type of fracture but he was placed in an Aircast and given crutches to limit his weightbearing. He was also to follow-up with orthopedics and is here today for his first visit with myself. He denies any prior left ankle injury or pain. He does however have a history of significant flatfoot bilaterally that does cause occasional foot issues. The pain's location is left lateral ankle ligament. he describes the symptoms as aching, sharp and stabbing. Symptoms improve with rest. Symptoms worsen with ankle plantarflexion/dorsiflexion, weight bearing (especially stair climbing), twisting activities. Patient states he denies having sensation of instability, decreased ROM    Treatment to date has been ice, NSAID, bracing without significant relief. Patient denies prior injury to ankle, denies numbness, tingling, fever, chills. Is affecting ADLs.   Pain is 8/10 at it's worst.    Outside reports reviewed: Emergency room note and imaging reviewed    Patient's medications, allergies, past medical, surgical, social and family histories were reviewed and updated as appropriate. MA HPI: Patient is a 41year old male. Patient is in the office today with left ankle injury. 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 Patient states that the injury happened 10/27/21. He is currently wearing an aircast and ambulating with crutches. Pain scale  8/10. His pain started out on the lateral side of his ankle and has progressed to the posterior. He complains of aching pain at night. He denies any previous injuries, surgeries or injections to his left ankle. Occupation: [employer] History  Patient's medications, allergies, past medical, surgical, social and family histories were reviewed and updated as appropriate. History reviewed. No pertinent past medical history. History reviewed. No pertinent surgical history. Family History   Problem Relation Age of Onset    Depression Mother     Diabetes Father     High Blood Pressure Father     Heart Disease Father     Glaucoma Father     Asthma Brother      Social History     Socioeconomic History    Marital status: Single     Spouse name: None    Number of children: None    Years of education: None    Highest education level: None   Occupational History    None   Tobacco Use    Smoking status: Current Every Day Smoker     Packs/day: 0.50     Types: Cigarettes    Smokeless tobacco: Never Used   Substance and Sexual Activity    Alcohol use:  Yes    Drug use: Not Currently     Types: Marijuana Orozco Ann)    Sexual activity: Yes     Partners: Female   Other Topics Concern    None   Social History Narrative    None     Social Determinants of Health     Financial Resource Strain:     Difficulty of Paying Living Expenses: Not on file   Food Insecurity:     Worried About Running Out of Food in the Last Year: Not on file    Ran Out of Food in the Last Year: Not on file   Transportation Needs:     Lack of Transportation (Medical): Not on file    Lack of Transportation (Non-Medical): Not on file   Physical Activity:     Days of Exercise per Week: Not on file    Minutes of Exercise per Session: Not on file   Stress:     Feeling of Stress : Not on file   Social Connections:     Frequency of Communication with Friends and Family: Not on file    Frequency of Social Gatherings with Friends and Family: Not on file    Attends Latter day Services: Not on file    Active Member of [address] [place] [address] [employer] or Organizations: Not on file    Attends Club or Organization Meetings: Not on file    Marital Status: Not on file   Intimate Partner Violence:     Fear of Current or Ex-Partner: Not on file    Emotionally Abused: Not on file    Physically Abused: Not on file    Sexually Abused: Not on file   Housing Stability:     Unable to Pay for Housing in the Last Year: Not on file    Number of [place] in the Last Year: Not on file    Unstable Housing in the Last Year: Not on file     Current Outpatient Medications   Medication Sig Dispense Refill    ibuprofen (ADVIL;MOTRIN) 800 MG tablet Take 1 tablet by mouth 3 times daily (with meals) 90 tablet 0     No current facility-administered medications for this visit. No Known Allergies      Review of Systems   Constitutional: Negative for activity change, fatigue and fever. HENT: Negative for hearing loss, sneezing and sore throat. Respiratory: Negative for cough, shortness of breath and wheezing. Cardiovascular: Negative for chest pain, palpitations and leg swelling. Gastrointestinal: Negative for diarrhea, nausea and vomiting. Musculoskeletal: Positive for arthralgias, gait problem and myalgias. Negative for back pain and joint swelling. Skin: Negative for color change, pallor, rash and wound. Neurological: Negative for weakness, numbness and headaches. Psychiatric/Behavioral: Negative for agitation and confusion. The patient is not hyperactive. Examination:  General Exam:  Vitals: Pulse 68   Resp 17   Ht 6' (1.829 m)   Wt 300 lb (136.1 kg)   SpO2 99%   BMI 40.69 kg/m²    Physical Exam  Constitutional:       General: He is not in acute distress. Appearance: Normal appearance. He is not ill-appearing. Eyes:      General:         Right eye: No discharge. Left eye: No discharge. Extraocular Movements: Extraocular movements intact. Cardiovascular:      Rate and Rhythm: Normal rate. Pulmonary:      Effort: Pulmonary effort is normal. No respiratory distress. Breath sounds: No wheezing. Musculoskeletal:         General: Tenderness present. No swelling, deformity or signs of injury. Right knee: Normal.      Left knee: Normal.      Right lower leg: Normal. No edema. Left lower leg: Normal. No edema. Right ankle: Normal.      Right Achilles Tendon: Normal.      Left ankle: Swelling present. No deformity, ecchymosis or lacerations. Tenderness present over the ATF ligament. No lateral malleolus, medial malleolus, CF ligament or posterior TF ligament tenderness. Normal range of motion. Anterior drawer test negative. Normal pulse. Left Achilles Tendon: Normal.      Right foot: Normal.      Left foot: Normal.   Skin:     General: Skin is warm and dry. Capillary Refill: Capillary refill takes less than 2 seconds. Neurological:      General: No focal deficit present. Mental Status: He is alert and oriented to person, place, and time. Sensory: No sensory deficit.       Coordination: Coordination normal.      Gait: Gait normal.   Psychiatric:         Mood and Affect: Mood normal.         Behavior: Behavior normal.        LEFT Foot and Ankle Exam      INSPECTION: ALIGNMENT:    Gait: Minimally antalgic     Alignment:     Hindfoot: Normal       Midfoot: Normal     Forefoot: Normal    Scars: None   Color: Normal     Swelling: Minimal swelling over lateral ankle ligaments    Atrophy: None Collective     Heel / Toe Walking: Mildly difficult to perform    Ankle-Hindfoot Alignment:    Good -plantigrade (PG), well aligned      TENDERNESS:    Sinus tarsi: None   Anteromedial joint line: none    Syndesmosis: none   Anterolateral joint line: none    ATFL: None   talonavicular: none     CFL: None   anterior tibialis: none    Anterolateral gutter: none  Extensor tendons: none    Fibula: none    Peroneal tendons: none     Peroneal tubercle. None     Medial/lateral achilles: none    Medial/lateral achilles insertion: none      Deltoid: none        Malleolus: none   Retrocalcaneal: none    PTT: none    Medial achilles: none    Navicular: none   Lateral achilles: none    Calcaneal tuberosity: none        Calcaneal cuboid: none  MT / MT heads: none     Navicular: none   Medial cord origin PF: none    Cuneiforms: none   Web space: none    Lisfranc none    Tarsal tunnel: none    Base of the fifth metatarsal: none     Tinels sign: neg        RANGE OF MOTION:  RIGHT   LEFT    STRENGTH: (Left) (* = pain)     Ankle DF/PF:    15/45    15/45    Anterior tibialis: 5/5    Eversion/Inversion:   15/25    15/25   Posterior tibialis: 5/5    Midfoot ABD/ADD:   10/10    10/10   Gastroc-soleus: 5/5    First MTP DF/PF:   60/25    60/25   Peroneals: 5/5     EHL: 5/5             FHL: 5/5        SPECIAL TESTS:    Anterior drawer:  Grade 1      (C-W contralateral side)     Inversion: 30°     Eversion 10°      Collective Instability: (Ant-post and varus-valgus)    Stable      PROVOCATIVE TESTING:    Forced DF/ER: No pain at syndesmosis. Mid-leg squeeze No pain at syndesmosis    Forced DF: No pain anterior joint line. Forced PF: No pain posterior ankle. Forced INV: No pain present laterally    Forced EV: No pain medial     Leys sign: Normal ankle plantar flexion.      Resisted follow up in 4 weeks for reevaluation. All questions were answered and patient voiced understanding.     Electronically signed by Lester Floyd DO on 12/8/2021 at 2:34 PM

## 2021-12-08 NOTE — PROGRESS NOTES
Patient presents to the office with a left ankle sprain. Pt states the pain and swelling has for the post part subsided. Pt states he's been wearing the boot everyday and the brace at night. Pt feels he is progressing and will feel weakness at times. Pt states he will avoid pivoting or twisting considering they will inflict sharp pains on his ankle. Pt states sitting here today he has a slight ache that he would rate a 3/10. Pt denies any new injuries.

## 2022-01-05 ENCOUNTER — TELEPHONE (OUTPATIENT)
Dept: ORTHOPEDIC SURGERY | Age: 42
End: 2022-01-05

## 2022-01-05 ENCOUNTER — OFFICE VISIT (OUTPATIENT)
Dept: ORTHOPEDIC SURGERY | Age: 42
End: 2022-01-05
Payer: COMMERCIAL

## 2022-01-05 VITALS
BODY MASS INDEX: 40.63 KG/M2 | OXYGEN SATURATION: 97 % | RESPIRATION RATE: 16 BRPM | HEART RATE: 78 BPM | WEIGHT: 300 LBS | HEIGHT: 72 IN

## 2022-01-05 DIAGNOSIS — S93.492A SPRAIN OF ANTERIOR TALOFIBULAR LIGAMENT OF LEFT ANKLE, INITIAL ENCOUNTER: Primary | ICD-10-CM

## 2022-01-05 PROCEDURE — G8417 CALC BMI ABV UP PARAM F/U: HCPCS | Performed by: STUDENT IN AN ORGANIZED HEALTH CARE EDUCATION/TRAINING PROGRAM

## 2022-01-05 PROCEDURE — 4004F PT TOBACCO SCREEN RCVD TLK: CPT | Performed by: STUDENT IN AN ORGANIZED HEALTH CARE EDUCATION/TRAINING PROGRAM

## 2022-01-05 PROCEDURE — 99213 OFFICE O/P EST LOW 20 MIN: CPT | Performed by: STUDENT IN AN ORGANIZED HEALTH CARE EDUCATION/TRAINING PROGRAM

## 2022-01-05 PROCEDURE — G8427 DOCREV CUR MEDS BY ELIG CLIN: HCPCS | Performed by: STUDENT IN AN ORGANIZED HEALTH CARE EDUCATION/TRAINING PROGRAM

## 2022-01-05 PROCEDURE — G8484 FLU IMMUNIZE NO ADMIN: HCPCS | Performed by: STUDENT IN AN ORGANIZED HEALTH CARE EDUCATION/TRAINING PROGRAM

## 2022-01-05 RX ORDER — IBUPROFEN 800 MG/1
800 TABLET ORAL
Qty: 90 TABLET | Refills: 0 | Status: SHIPPED | OUTPATIENT
Start: 2022-01-05 | End: 2022-07-06 | Stop reason: SDUPTHER

## 2022-01-05 ASSESSMENT — ENCOUNTER SYMPTOMS
BACK PAIN: 0
VOMITING: 0
COLOR CHANGE: 0
COUGH: 0
SHORTNESS OF BREATH: 0
DIARRHEA: 0
SORE THROAT: 0
NAUSEA: 0
WHEEZING: 0

## 2022-01-05 NOTE — PATIENT INSTRUCTIONS
Ibuprofen sent to pharmacy  Discontinue the cam boot and start winging into the ankle brace move with a good supportive shoe. Formative physical therapy was ordered for patient, but must be approved through [facility]  Return to work with 8 hours days  Continue to weight bear as tolerated  Continue range of motion  Ice and elevate as needed  Tylenol or Motrin for pain  Follow up in 2 months    Patient Education        Ankle: Exercises  Introduction  Here are some examples of exercises for you to try. The exercises may be suggested for a condition or for rehabilitation. Start each exercise slowly. Ease off the exercises if you start to have pain. You will be told when to start these exercises and which ones will work best for you. How to do the exercises  'Alphabet' exercise    1. Trace the alphabet with your toe. This helps your ankle move in all directions. Side-to-side knee swing exercise    1. Sit in a chair with your foot flat on the floor. 2. Slowly move your knee from side to side while keeping your foot pressed flat. 3. Continue this exercise for 2 to 3 minutes. Towel curl    1. While sitting, place your foot on a towel on the floor and scrunch the towel toward you with your toes. 2. Then use your toes to push the towel away from you. 3. Make this exercise more challenging by placing a weighted object, such as a soup can, on the other end of the towel. Towel stretch    1. Sit with your legs extended and knees straight. 2. Place a towel around your foot just under the toes. 3. Hold each end of the towel in each hand, with your hands above your knees. 4. Pull back with the towel so that your foot stretches toward you. 5. Hold the position for at least 15 to 30 seconds. 6. Repeat 2 to 4 times a session, up to 5 sessions a day. Ankle eversion exercise    1. Start by sitting with your foot flat on the floor and pushing it outward against an immovable object such as the wall or heavy furniture.  Hold for about 6 seconds, then relax. Repeat 8 to 12 times. 2. After you feel comfortable with this, try using rubber tubing looped around the outside of your feet for resistance. Push your foot out to the side against the tubing, and then count to 10 as you slowly bring your foot back to the middle. Repeat 8 to 12 times. Isometric opposition exercises    1. While sitting, put your feet together flat on the floor. 2. Press your injured foot inward against your other foot. Hold for about 6 seconds, and relax. Repeat 8 to 12 times. 3. Then place the heel of your other foot on top of the injured one. Push down with the top heel while trying to push up with your injured foot. Hold for about 6 seconds, and relax. Repeat 8 to 12 times. Follow-up care is a key part of your treatment and safety. Be sure to make and go to all appointments, and call your doctor if you are having problems. It's also a good idea to know your test results and keep a list of the medicines you take. Where can you learn more? Go to https://[employer]. org and sign in to your [employer] account. Enter N471 in the [employer] box to learn more about \"Ankle: Exercises. \"     If you do not have an account, please click on the \"Sign Up Now\" link. Current as of: July 1, 2021               Content Version: 13.1  © 6395-0457 [employer]wise, Incorporated. Care instructions adapted under license by [facility] ([facility]). If you have questions about a medical condition or this instruction, always ask your healthcare professional. [place] [address] any warranty or liability for your use of this information.

## 2022-01-05 NOTE — LETTER
[facility]  [address]  Phone: [phone]  Fax: [phone]    Herson Mccurdy DO        January 5, 2022     Patient: Ashwin Funez   YOB: 1980   Date of Visit: 1/5/2022       To Whom it May Concern:    Sandip Singh was seen in my clinic on 1/5/2022. He may return to work on 01/06/2022. Patient is restriction 8 hours with the cam boot for the rest of the week and starting Monday 01/10/2022 patient is allowed to wear his lace up ankle brace to support the ankle. Patient is allowed to take breaks throughout the days to rest the ankle. If you have any questions or concerns, please don't hesitate to call.     Sincerely,         Herson Mccurdy DO

## 2022-01-05 NOTE — PROGRESS NOTES
Patient is in the office for a recheck of the left ankle. Patient states that his ankle is feeling better. He is still currently wearing his cam boot. Patient states that when he is out of his boot trying to walk he is very stiff and limping a lot. He does have videos of him not being able to walk very far. Denies any new injury or falls the the ankle.

## 2022-01-05 NOTE — PROGRESS NOTES
1/5/2022   Chief Complaint   Patient presents with    Ankle Pain     Left      Updated HPI: Patient is here for reevaluation of his left ankle. Patient states he has significantly improved and he continues to use the walking boot. He states he has been been inconsistent with his home exercise program.  He states he does have somewhat of a limp when attempting to do heel-to-toe walking. He has no new complaints this time or any new injuries. Previous HPI (12-8-2021): Patient is here for reevaluation of his left ankle. Patient states he has been compliant in his walking boot and states he has significant improved since last being seen. He also states he has been vigorous with his home exercise program.  He notices that his swelling has significantly improved and he is able to ambulate out of the boot with minimal pain. He has no new injury or complaints last being seen. Previous HPI (11-3-2021):                           Lobito Sen is a 35 y.o. male works as a ,  referred by emergency room for evaluation and treatment of left ankle pain. 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 He had immediate pain and inability ambulate. He was taken to the emergency room with x-rays were performed and were negative for any type of fracture but he was placed in an Aircast and given crutches to limit his weightbearing. He was also to follow-up with orthopedics and is here today for his first visit with myself. He denies any prior left ankle injury or pain. He does however have a history of significant flatfoot bilaterally that does cause occasional foot issues. The pain's location is left lateral ankle ligament. he describes the symptoms as aching, sharp and stabbing.  Symptoms improve with rest. Symptoms worsen with ankle plantarflexion/dorsiflexion, weight bearing (especially stair climbing), twisting activities. Patient states he denies having sensation of instability, decreased ROM    Treatment to date has been ice, NSAID, bracing without significant relief. Patient denies prior injury to ankle, denies numbness, tingling, fever, chills. Is affecting ADLs. Pain is 8/10 at it's worst.    Outside reports reviewed: Emergency room note and imaging reviewed    Patient's medications, allergies, past medical, surgical, social and family histories were reviewed and updated as appropriate. MA HPI: Patient is a 41year old male. Patient is in the office today with left ankle injury. 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 Patient states that the injury happened 10/27/21. He is currently wearing an aircast and ambulating with crutches. Pain scale  8/10. His pain started out on the lateral side of his ankle and has progressed to the posterior. He complains of aching pain at night. He denies any previous injuries, surgeries or injections to his left ankle. Occupation: [employer] History  Patient's medications, allergies, past medical, surgical, social and family histories were reviewed and updated as appropriate. No past medical history on file. No past surgical history on file. Family History   Problem Relation Age of Onset    Depression Mother     Diabetes Father     High Blood Pressure Father     Heart Disease Father     Glaucoma Father     Asthma Brother      Social History     Socioeconomic History    Marital status: Single     Spouse name: None    Number of children: None    Years of education: None    Highest education level: None   Occupational History    None   Tobacco Use    Smoking status: Current Every Day Smoker     Packs/day: 0.50     Types: Cigarettes    Smokeless tobacco: Never Used   Substance and Sexual Activity    Alcohol use:  Yes    Drug use: Not Currently     Types: Marijuana Drema Marts)    Sexual activity: Yes     Partners: Female   Other Topics Concern    None   Social History Narrative    None     Social Determinants of Health     Financial Resource Strain:     Difficulty of Paying Living Expenses: Not on file   Food Insecurity:     Worried About Running Out of Food in the Last Year: Not on file    Esteban of Food in the Last Year: Not on file   Transportation Needs:     Lack of Transportation (Medical): Not on file    Lack of Transportation (Non-Medical): Not on file   Physical Activity:     Days of Exercise per Week: Not on file    Minutes of Exercise per Session: Not on file   Stress:     Feeling of Stress : Not on file   Social Connections:     Frequency of Communication with Friends and Family: Not on file    Frequency of Social Gatherings with Friends and Family: Not on file    Attends Temple Services: Not on file    Active Member of [address] [place] [address] [employer] or Organizations: Not on file    Attends Club or Organization Meetings: Not on file    Marital Status: Not on file   Intimate Partner Violence:     Fear of Current or Ex-Partner: Not on file    Emotionally Abused: Not on file    Physically Abused: Not on file    Sexually Abused: Not on file   Housing Stability:     Unable to Pay for Housing in the Last Year: Not on file    Number of [place] in the Last Year: Not on file    Unstable Housing in the Last Year: Not on file     Current Outpatient Medications   Medication Sig Dispense Refill    ibuprofen (ADVIL;MOTRIN) 800 MG tablet Take 1 tablet by mouth 3 times daily (with meals) 90 tablet 0     No current facility-administered medications for this visit. No Known Allergies      Review of Systems   Constitutional: Negative for activity change, fatigue and fever. HENT: Negative for hearing loss, sneezing and sore throat. Respiratory: Negative for cough, shortness of breath and wheezing.     Cardiovascular: Negative for chest pain, palpitations and leg swelling. Gastrointestinal: Negative for diarrhea, nausea and vomiting. Musculoskeletal: Positive for arthralgias and gait problem. Negative for back pain, joint swelling and myalgias. Skin: Negative for color change, pallor, rash and wound. Neurological: Negative for weakness, numbness and headaches. Psychiatric/Behavioral: Negative for agitation and confusion. The patient is not hyperactive. Examination:  General Exam:  Vitals: Resp 16   Ht 6' (1.829 m)   BMI 40.69 kg/m²    Physical Exam  Constitutional:       General: He is not in acute distress. Appearance: Normal appearance. He is not ill-appearing. Eyes:      General:         Right eye: No discharge. Left eye: No discharge. Extraocular Movements: Extraocular movements intact. Cardiovascular:      Rate and Rhythm: Normal rate. Pulmonary:      Effort: Pulmonary effort is normal. No respiratory distress. Breath sounds: No wheezing. Musculoskeletal:         General: Tenderness present. No swelling, deformity or signs of injury. Right knee: Normal.      Left knee: Normal.      Right lower leg: Normal. No edema. Left lower leg: Normal. No edema. Right ankle: Normal.      Right Achilles Tendon: Normal.      Left ankle: Swelling present. No deformity, ecchymosis or lacerations. Tenderness present over the ATF ligament. No lateral malleolus, medial malleolus, CF ligament or posterior TF ligament tenderness. Normal range of motion. Anterior drawer test negative. Normal pulse. Left Achilles Tendon: Normal.      Right foot: Normal.      Left foot: Normal.   Skin:     General: Skin is warm and dry. Capillary Refill: Capillary refill takes less than 2 seconds. Neurological:      General: No focal deficit present. Mental Status: He is alert and oriented to person, place, and time. Sensory: No sensory deficit. Coordination: Coordination normal.      Gait: Gait normal.   Psychiatric:         Mood and Affect: Mood normal.         Behavior: Behavior normal.        LEFT Foot and Ankle Exam      INSPECTION: ALIGNMENT:    Gait: Minimally antalgic with a limp     Alignment:     Hindfoot: Normal       Midfoot: Normal     Forefoot: Normal    Scars: None   Color: Normal     Swelling: Minimal swelling over lateral ankle ligaments    Atrophy: None Collective     Heel / Toe Walking: Mildly difficult to perform    Ankle-Hindfoot Alignment:    Good -plantigrade (PG), well aligned      TENDERNESS:    Sinus tarsi: None   Anteromedial joint line: none    Syndesmosis: none   Anterolateral joint line: none    ATFL: None   talonavicular: none     CFL: None   anterior tibialis: none    Anterolateral gutter: none  Extensor tendons: none    Fibula: none    Peroneal tendons: none     Peroneal tubercle.  None     Medial/lateral achilles: none    Medial/lateral achilles insertion: none      Deltoid: none        Malleolus: none   Retrocalcaneal: none    PTT: none    Medial achilles: none    Navicular: none   Lateral achilles: none    Calcaneal tuberosity: none        Calcaneal cuboid: none  MT / MT heads: none     Navicular: none   Medial cord origin PF: none    Cuneiforms: none   Web space: none    Lisfranc none    Tarsal tunnel: none    Base of the fifth metatarsal: none     Tinels sign: neg        RANGE OF MOTION:  RIGHT   LEFT    STRENGTH: (Left) (* = pain)     Ankle DF/PF:    15/45    15/45    Anterior tibialis: 5/5    Eversion/Inversion:   15/25    15/25   Posterior tibialis: 5/5    Midfoot ABD/ADD:   10/10    10/10   Gastroc-soleus: 5/5    First MTP DF/PF:   60/25    60/25   Peroneals: 5/5     EHL: 5/5             FHL: 5/5        SPECIAL TESTS:    Anterior drawer:  Grade 1      (C-W contralateral side)     Inversion: 30°     Eversion 10°      Collective Instability: (Ant-post and varus-valgus)    Stable      PROVOCATIVE TESTING:    Forced DF/ER: No pain at syndesmosis. Mid-leg squeeze No pain at syndesmosis    Forced DF: No pain anterior joint line. Forced PF: No pain posterior ankle. Forced INV: No pain present laterally    Forced EV: No pain medial     Leys sign: Normal ankle plantar flexion. Resisted peroneal No subluxation or pain    1st-2nd MT toggle No pain at Lisfranc    MT-T torque No pain at Lisfranc      NEUROLOGIC TESTING:    All dermatomes foot, ankle and leg have normal sensation light touch    Ankle Reflexes 2+, symmetric     Negative Babinski and No Clonus      VASCULAR:  2+ pulses PT/DT with brisk capillary refill toes. Diagnostic testing:  No new imaging    Previous imaging:  X-ray images were reviewed by myself and discussed with the patient:  3 views of left ankle in a skeletally mature patient shows no acute osseous abnormalities. Patient does have mild generative changes at the left ankle joint and significant flatfoot deformity. He also has several osteophytes including at the calcaneus. Ankle mortise is well-positioned with symmetric joint space throughout. Appropriate tib-fib overlap and clear space seen. Office Procedures:  No orders of the defined types were placed in this encounter. Assessment and Plan    A: Left ankle sprain    P:   I had a thorough discussion with the patient regarding his left ankle sprain and his treatment course. I explained that he is improving significantly and we will begin to wean him out of the boot. He will transition to lace up ankle brace but is not need to use any type of bracing while in his house. The goal will be to wean out of the boot completely by Monday (5 days) and then he will also attempt to wean out of the lace up ankle brace in approximately 4 weeks. Patient was given a prescription for formal physical therapy.   He will also continue his home exercise program.  He will continue being weightbearing as tolerated and range of motion as tolerated. Ibuprofen prescription sent to the pharmacy for the patient. He will follow-up in 2 months for reevaluation. He will return earlier if any issues occur. He will continue to ice for pain control. All questions answered and patient voiced understanding.       Electronically signed by Maryse Aguilar DO on 1/5/2022 at 1:25 PM

## 2022-01-17 DIAGNOSIS — S93.492A SPRAIN OF ANTERIOR TALOFIBULAR LIGAMENT OF LEFT ANKLE, INITIAL ENCOUNTER: Primary | ICD-10-CM

## 2022-01-20 ENCOUNTER — HOSPITAL ENCOUNTER (OUTPATIENT)
Dept: PHYSICAL THERAPY | Age: 42
Setting detail: THERAPIES SERIES
Discharge: HOME | End: 2022-01-20
Payer: COMMERCIAL

## 2022-01-20 NOTE — FLOWSHEET NOTE
Outpatient Physical Therapy  [facility]           [x] Phone: [phone]   Fax: [phone]  [facility]           [] Phone: [phone]   Fax: [phone]        Physical Therapy Daily Treatment Note  Date:  2022    Patient Name:  Deng Pham    :  1980  MRN: [id number]      Pt cancelled PT eval on 22 due to not feeling well.      Electronically signed by:  Hernandez Biggs PT, DPT, OCS  2022, 6:44 AM    2022 6:44 AM

## 2022-01-27 ENCOUNTER — HOSPITAL ENCOUNTER (OUTPATIENT)
Dept: PHYSICAL THERAPY | Age: 42
Setting detail: THERAPIES SERIES
Discharge: HOME | End: 2022-01-27
Payer: COMMERCIAL

## 2022-01-27 PROCEDURE — 97110 THERAPEUTIC EXERCISES: CPT

## 2022-01-27 PROCEDURE — 97161 PT EVAL LOW COMPLEX 20 MIN: CPT

## 2022-01-27 PROCEDURE — 97535 SELF CARE MNGMENT TRAINING: CPT

## 2022-01-27 NOTE — PLAN OF CARE
Outpatient Physical Therapy           [facility]           [x] Phone: [phone]   Fax: [phone]  [facility]           [] Phone: [phone]   Fax: [phone]     To: Referring Practitioner: Dr. Mele Brown    From: Veronica Garces, PT, PT     Patient: Reji Daly       : 1980  Diagnosis: Diagnosis: Sprain of Anterior Talofibular ligament of left ankle   Treatment Diagnosis: Treatment Diagnosis: Sprain of anterior talofibular ligament of left ankle;  Left ankle pain; abnormalities of gait and mobility   Date: 2022    Physical Therapy Certification/Re-Certification Form  Dear Dr. Fuentes Shall  The following patient has been evaluated for physical therapy services and for therapy to continue, insurance requires physician review of the treatment plan initially and every 90 days. Please review the attached evaluation and/or summary of the patient's plan of care, and verify that you agree therapy should continue by signing the attached document and sending it back to our office. Assessment:    Assessment: Luiza Ford is a 35year old male who was referred to physical therapy for treatment of sprain of the anterior talofibular ligament of the left ankle. Objective findings include decreased L ankle ROM, decreased L ankle strength, decreased ankle proprioception and impaired gait. Nancy Frank will benefit from skilled therapeutic intervention in this setting to decrease pain and improve overall functional mobility.     Plan of Care/Treatment to date:  [x] Therapeutic Exercise  [x] Modalities:  [x] Therapeutic Activity     [] Ultrasound  [] Electrical Stimulation  [x] Gait Training      [] Cervical Traction [] Lumbar Traction  [x] Neuromuscular Re-education    [x] Cold/hotpack [] Iontophoresis   [x] Instruction in HEP      [x] Vasopneumatic    [] Dry Needling  [x] Manual Therapy               [] Aquatic Therapy       Other:          Frequency/Duration:  # Days per week: [] 1 day # Weeks: [] 1 week [] 5 weeks     [x] 2 days   [] 2 weeks [x] 6 weeks     [] 3 days   [] 3 weeks [] 7 weeks     [] 4 days   [] 4 weeks [] 8 weeks         [] 9 weeks [] 10 weeks         [] 11 weeks [] 12 weeks    Rehab Potential/Progress: [] Excellent [x] Good [] Fair  [] Poor     Goals:    Patient goals : Improve strength, improve walking, return to working out  Short term goals  Time Frame for Short term goals: 4 weeks  Short term goal 1: Patient will report compliance with HEP. Short term goal 2: Patient will present with improved L ankle AROM to 30 degrees plantarflexion, 12 degrees dorsiflexion, 25 degrees inversion, and 20 degrees eversion. Short term goal 3: Patient will report a decrease in L ankle pain at the end of the work day from 7/10 to 3/10. Short term goal 4: Patient will present with improved gait pattern with improved toe off. Long term goals  Time Frame for Long term goals : 10 weeks  Long term goal 1: Patient will present with improved L ankle AROM to WNL. Long term goal 2: Patient will present with improved L ankle strength to 5/5 globally. Long term goal 3: Patient will present with normalized gait pattern and ability to perform a SLS for > 20 seconds. Long term goal 4: Patient will report an improved ability to complete a 12 hour shift without pain. Electronically signed by:  Mariela Baltazar PT, 1/27/2022, 5:29 PM        If you have any questions or concerns, please don't hesitate to call.   Thank you for your referral.      Physician Signature:________________________________Date:_________ TIME: _____  By signing above, therapists plan is approved by physician

## 2022-01-27 NOTE — PROGRESS NOTES
Physical Therapy  Initial Assessment  Date: 2022  Patient Name: Ceasar Muñoz  MRN: [id number]  : 1980     Treatment Diagnosis: Sprain of anterior talofibular ligament of left ankle;  Left ankle pain; abnormalities of gait and mobility    Restrictions       Subjective   General  Chart Reviewed: Yes  Patient assessed for rehabilitation services?: Yes  Referring Practitioner: Dr. Rosanne Bunn  Referral Date : 22  Diagnosis: Sprain of Anterior Talofibular ligament of left ankle  General Comment  Comments: 0/10 at rest  ;  7/10 L ankle pain at the end of the day  PT Visit Information  Onset Date: 22  PT Insurance Information: Worker's Comp-  12 visits by 22  Progress Note Counter: 10th visit  Subjective  Subjective: Patient sprained his ankle back in 2021. MD did take an x-ray , which showed that nothing was fracture. He did wear a boot for a while but is now wearing an ankle brace. Andres Pop states that prior to his ankle sprain, he was trying to exercise and use his punching bag. He would like to power lift with his son. He would like to get back to the point of working out again. Andres Pop reports a significant increase in pain after working his 12 hour shifts. Andres Pop states that the pain decreases in the morning after warming it up and getting a little pressure on it. Patient does not have a primary care physician.          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 Operating a [employer]  Leisure & Hobbies: Power lifting, punching bag  Additional Comments: CLOF:  Able to walk 20 minutes maximum before having to take, difficulty going up and down the stairs, difficulty pushing off with toes      PLOF: Able to walk hours without taking a break   , no ankle pain, active    Objective     Observation/Palpation  Posture: Fair  Patient donning ankle brace on Left. Observation: Abnormal gait pattern. Patient with decreased toe off during ambulation and ambulates with decreased stance time on the LLE. AROM LLE (degrees)  LLE AROM : Exceptions  L Ankle Dorsiflexion 0-20: 8  L Ankle Plantar Flexion 0-45: 20  L Ankle Forefoot Inversion 0-40: 20  L Ankle Forefoot Eversion 0-20: 8    Strength LLE  Strength LLE: Exception  L Ankle Dorsiflexion: 5/5  L Ankle Plantar Flexion: 4+/5  L Ankle Inversion: 5/5  L Ankle Eversion: 5/5          Assessment   Conditions Requiring Skilled Therapeutic Intervention  Body structures, Functions, Activity limitations: Increased pain;Decreased ROM; Decreased functional mobility ; Decreased strength  Assessment: Aliza Sam is a 35year old male who was referred to physical therapy for treatment of sprain of the anterior talofibular ligament of the left ankle. Objective findings include decreased L ankle ROM, decreased L ankle strength, decreased ankle proprioception and impaired gait. Otto Patel will benefit from skilled therapeutic intervention in this setting to decrease pain and improve overall functional mobility.   Treatment Diagnosis: Sprain of anterior talofibular ligament of left ankle;  Left ankle pain; abnormalities of gait and mobility  Barriers to Learning: no  Activity Tolerance  Activity Tolerance: Patient Tolerated treatment well         Plan   Plan  Times per week: 2x  Plan weeks: 6  Current Treatment Recommendations: ROM,ADL/Self-care Training,Pain Management,Functional Mobility Training,Gait Training,Modalities,Strengthening,Neuromuscular Re-education,Home Exercise Program,Manual Therapy - Soft Tissue Mobilization      OutComes Score 44/80      Goals  Short term goals  Time Frame for Short term goals: 4 weeks  Short term goal 1: Patient will report compliance with HEP. Short term goal 2: Patient will present with improved L ankle AROM to 30 degrees plantarflexion, 12 degrees dorsiflexion, 25 degrees inversion, and 20 degrees eversion. Short term goal 3: Patient will report a decrease in L ankle pain at the end of the work day from 7/10 to 3/10. Short term goal 4: Patient will present with improved gait pattern with improved toe off. Long term goals  Time Frame for Long term goals : 10 weeks  Long term goal 1: Patient will present with improved L ankle AROM to WNL. Long term goal 2: Patient will present with improved L ankle strength to 5/5 globally. Long term goal 3: Patient will present with normalized gait pattern and ability to perform a SLS for > 20 seconds. Long term goal 4: Patient will report an improved ability to complete a 12 hour shift without pain.   Patient Goals   Patient goals : Improve strength, improve walking, return to working out       Therapy Time   Individual Concurrent Group Co-treatment   Time In 1504         Time Out 1545         Minutes 41         Timed Code Treatment Minutes: [address], PT

## 2022-01-27 NOTE — FLOWSHEET NOTE
Outpatient Physical Therapy  [place]           [x] Phone: [phone]   Fax: [phone]  [place]           [] Phone: [phone]   Fax: [phone]        Physical Therapy Daily Treatment Note  Date:  2022    Patient Name:  Duke Barger    :  1980  MRN: [id number]  Restrictions/Precautions:    Diagnosis:   Diagnosis: Sprain of Anterior Talofibular ligament of left ankle  Date of Injury/Surgery:   Treatment Diagnosis: Treatment Diagnosis: Sprain of anterior talofibular ligament of left ankle;  Left ankle pain; abnormalities of gait and mobility    Insurance/Certification information: PT Insurance Information: Worker's Comp-  12 visits by 22   Referring Physician:  Referring Practitioner: Dr. Tresa Dunne  Next Doctor Visit:    Plan of care signed (Y/N):    Outcome Measure:   Visit# / total visits:     Pain level: /10   Goals:     Patient goals : Improve strength, improve walking, return to working out  Short term goals  Time Frame for Short term goals: 4 weeks  Short term goal 1: Patient will report compliance with HEP. Short term goal 2: Patient will present with improved L ankle AROM to 30 degrees plantarflexion, 12 degrees dorsiflexion, 25 degrees inversion, and 20 degrees eversion. Short term goal 3: Patient will report a decrease in L ankle pain at the end of the work day from 7/10 to 3/10. Short term goal 4: Patient will present with improved gait pattern with improved toe off. Long term goals  Time Frame for Long term goals : 10 weeks  Long term goal 1: Patient will present with improved L ankle AROM to WNL. Long term goal 2: Patient will present with improved L ankle strength to 5/5 globally. Long term goal 3: Patient will present with normalized gait pattern and ability to perform a SLS for > 20 seconds. Long term goal 4: Patient will report an improved ability to complete a 12 hour shift without pain.     Summary of Evaluation: Assessment: Carol Jason Fantasma Tenorio is a 35year old male who was referred to physical therapy for treatment of sprain of the anterior talofibular ligament of the left ankle. Objective findings include decreased L ankle ROM, decreased L ankle strength, decreased ankle proprioception and impaired gait. Megha Ferrell will benefit from skilled therapeutic intervention in this setting to decrease pain and improve overall functional mobility. Subjective:  See sharla         Any changes in Ambulatory Summary Sheet? None        Objective:  See eval   COVID screening questions were asked and patient attested that there had been no contact or symptoms        Exercises: (No more than 4 columns)   Exercise/Equipment 1/27/21 #1 Date Date           WARM UP                     TABLE      Gastroc stretch with towel 30\", x2     Ankle plantarflexion with RTB      Inversion/ Eversion towel slides x10 each     Seated ankle dorsiflexion x10              STANDING                                                     PROPRIOCEPTION                                    MODALITIES                      Other Therapeutic Activities/Education:    1/27/2022: Patient received education on their current pathology and how their condition effects them with their functional activities. Patient understood discussion and questions were answered. Patient understands their activity limitations and understands rational for treatment progression. Home Exercise Program:  HEP created and reviewed      Manual Treatments:        Modalities:        Communication with other providers:  POC sent for cosign on 1/27/22      Assessment:  (Response towards treatment session) (Pain Rating)    Assessment: Elena Rubin is a 35year old male who was referred to physical therapy for treatment of sprain of the anterior talofibular ligament of the left ankle. Objective findings include decreased L ankle ROM, decreased L ankle strength, decreased ankle proprioception and impaired gait.  Megha Ferrell will benefit from skilled therapeutic intervention in this setting to decrease pain and improve overall functional mobility.       Plan for Next Session:  Progress ankle ROM activities, progress strengthening, start weight bearing in bars, gait training      Time In / Time Out:    1830-2411       If BWC Please Indicate Time In/Out/Total Time  CPT Code Time in Time out Total Time   Eval  1504 1528 24   There ex 20703  6377 1537 9   ADL 79338  1537 1545 8                                 Total for session      41       Timed Code/Total Treatment Minutes:  41'/41'     Eval 24',  There ex 9' , ADL 8'      Next Progress Note due:  10th visit      Plan of Care Interventions:  [x] Therapeutic Exercise  [x] Modalities:  [x] Therapeutic Activity     [] Ultrasound  [x] Estim  [x] Gait Training      [] Cervical Traction [] Lumbar Traction  [x] Neuromuscular Re-education    [x] Cold/hotpack [] Iontophoresis   [x] Instruction in HEP      [x] Vasopneumatic   [] Dry Needling    [x] Manual Therapy               [] Aquatic Therapy              Electronically signed by:  Aida Adair PT, 1/27/2022, 5:39 PM

## 2022-01-31 ENCOUNTER — HOSPITAL ENCOUNTER (OUTPATIENT)
Dept: PHYSICAL THERAPY | Age: 42
Setting detail: THERAPIES SERIES
Discharge: HOME | End: 2022-01-31
Payer: COMMERCIAL

## 2022-01-31 PROCEDURE — 97110 THERAPEUTIC EXERCISES: CPT

## 2022-01-31 PROCEDURE — 97530 THERAPEUTIC ACTIVITIES: CPT

## 2022-01-31 NOTE — FLOWSHEET NOTE
Outpatient Physical Therapy  [place]           [x] Phone: [phone]   Fax: [phone]  [facility]           [] Phone: [phone]   Fax: [phone]        Physical Therapy Daily Treatment Note  Date:  2022    Patient Name:  Cj Mcghee    :  1980  MRN: [id number]  Restrictions/Precautions:    Diagnosis:   Diagnosis: Sprain of Anterior Talofibular ligament of left ankle  Date of Injury/Surgery:   Treatment Diagnosis: Treatment Diagnosis: Sprain of anterior talofibular ligament of left ankle;  Left ankle pain; abnormalities of gait and mobility    Insurance/Certification information: PT Insurance Information: Worker's Comp-  12 visits by 22   Referring Physician:  Referring Practitioner: Dr. Ric French  Next Doctor Visit:    Plan of care signed (Y/N):    Outcome Measure:   Visit# / total visits:     Pain level: 1/10   Goals:     Patient goals : Improve strength, improve walking, return to working out  Short term goals  Time Frame for Short term goals: 4 weeks  Short term goal 1: Patient will report compliance with HEP. Short term goal 2: Patient will present with improved L ankle AROM to 30 degrees plantarflexion, 12 degrees dorsiflexion, 25 degrees inversion, and 20 degrees eversion. Short term goal 3: Patient will report a decrease in L ankle pain at the end of the work day from 7/10 to 3/10. Short term goal 4: Patient will present with improved gait pattern with improved toe off. Long term goals  Time Frame for Long term goals : 10 weeks  Long term goal 1: Patient will present with improved L ankle AROM to WNL. Long term goal 2: Patient will present with improved L ankle strength to 5/5 globally. Long term goal 3: Patient will present with normalized gait pattern and ability to perform a SLS for > 20 seconds. Long term goal 4: Patient will report an improved ability to complete a 12 hour shift without pain.     Summary of Evaluation: Assessment: Izzy Hicks Shakeel Stephens is a 35year old male who was referred to physical therapy for treatment of sprain of the anterior talofibular ligament of the left ankle. Objective findings include decreased L ankle ROM, decreased L ankle strength, decreased ankle proprioception and impaired gait. Bronson Cam will benefit from skilled therapeutic intervention in this setting to decrease pain and improve overall functional mobility. Subjective:  Pt states his ankle is a little achy from just getting off work but overall feels he is doing better. No longer having to wear brace. Any changes in Ambulatory Summary Sheet? 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 Exercises: (No more than 4 columns)   Exercise/Equipment 1/27/21 #1 Date 1/31/22 #2 Date           WARM UP                     TABLE      Gastroc stretch with towel 30\", x2 30\", x2    Ankle plantarflexion with RTB  10x2    Inversion/ Eversion towel slides x10 each 10x 2 ea    Seated ankle dorsiflexion x10 10x2     Ankle DF   RTB 10x2    Ankle Inv and Evr   RTB 10x2    Seated heel raises  2x10          STANDING            Slow marches   Wearing boots, 10x,  aeromat 10x    SLS  Wearing boots, 10 sec x 3 level surface                                 PROPRIOCEPTION                                    MODALITIES                      Other Therapeutic Activities/Education:    1/31/2022: Patient received education on their current pathology and how their condition effects them with their functional activities. Patient understood discussion and questions were answered. Patient understands their activity limitations and understands rational for treatment progression. Home Exercise Program:  HEP created and reviewed  Reviewed and added TB ankle EVR,INV, DF and seated heel raises. Provided with handouts. Manual Treatments:        Modalities:        Communication with other providers:  POC sent for cosign on 1/27/22      Assessment:  (Response towards treatment session) (Pain Rating)  Pt demonstrated overall good tolerance to today's session without adverse reaction. Did well with additional exercises. will benefit from skilled therapeutic intervention in this setting to decrease pain and improve overall functional mobility. End pain 0/10    Assessment: Elsie Burr is a 35year old male who was referred to physical therapy for treatment of sprain of the anterior talofibular ligament of the left ankle. Objective findings include decreased L ankle ROM, decreased L ankle strength, decreased ankle proprioception and impaired gait. Marcie Rodriguez will benefit from skilled therapeutic intervention in this setting to decrease pain and improve overall functional mobility.       Plan for Next Session:  Progress ankle ROM activities, progress strengthening, start weight bearing in bars, gait training      Time In / Time Out:    3218-6632       If BWC Please Indicate Time In/Out/Total Time  CPT Code Time in Time out Total Time   Eval       There ex 72762  1427 1500 33   ADL 55258  1500 1510 10                                 Total for session      43       Timed Code/Total Treatment Minutes: 43/43    (2) TE, (1) TA      Next Progress Note due:  10th visit      Plan of Care Interventions:  [x] Therapeutic Exercise  [x] Modalities:  [x] Therapeutic Activity     [] Ultrasound  [x] Estim  [x] Gait Training      [] Cervical Traction [] Lumbar Traction  [x] Neuromuscular Re-education    [x] Cold/hotpack [] Iontophoresis   [x] Instruction in HEP      [x] Vasopneumatic   [] Dry Needling    [x] Manual Therapy               [] Aquatic Therapy              Electronically signed by:  Sen Rainey 1/31/2022, 2:27 PM

## 2022-02-05 ENCOUNTER — HOSPITAL ENCOUNTER (OUTPATIENT)
Dept: PHYSICAL THERAPY | Age: 42
Setting detail: THERAPIES SERIES
Discharge: HOME | End: 2022-02-05
Payer: COMMERCIAL

## 2022-02-05 PROCEDURE — 97110 THERAPEUTIC EXERCISES: CPT

## 2022-02-05 NOTE — FLOWSHEET NOTE
Outpatient Physical Therapy  [place]           [x] Phone: [phone]   Fax: [phone]  [facility]           [] Phone: [phone]   Fax: [phone]        Physical Therapy Daily Treatment Note  Date:  2022    Patient Name:  Kathrine Lombard    :  1980  MRN: [id number]  Restrictions/Precautions:    Diagnosis:   Diagnosis: Sprain of Anterior Talofibular ligament of left ankle  Date of Injury/Surgery:   Treatment Diagnosis: Treatment Diagnosis: Sprain of anterior talofibular ligament of left ankle;  Left ankle pain; abnormalities of gait and mobility    Insurance/Certification information: PT Insurance Information: Worker's Comp-  12 visits by 22   Referring Physician:  Referring Practitioner: Dr. Raghav Mccoy  Next Doctor Visit:    Plan of care signed (Y/N):    Outcome Measure:   Visit# / total visits:   3/12  Pain level: 0/10   Goals:     Patient goals : Improve strength, improve walking, return to working out  Short term goals  Time Frame for Short term goals: 4 weeks  Short term goal 1: Patient will report compliance with HEP. Short term goal 2: Patient will present with improved L ankle AROM to 30 degrees plantarflexion, 12 degrees dorsiflexion, 25 degrees inversion, and 20 degrees eversion. Short term goal 3: Patient will report a decrease in L ankle pain at the end of the work day from 7/10 to 3/10. Short term goal 4: Patient will present with improved gait pattern with improved toe off. Long term goals  Time Frame for Long term goals : 10 weeks  Long term goal 1: Patient will present with improved L ankle AROM to WNL. Long term goal 2: Patient will present with improved L ankle strength to 5/5 globally. Long term goal 3: Patient will present with normalized gait pattern and ability to perform a SLS for > 20 seconds. Long term goal 4: Patient will report an improved ability to complete a 12 hour shift without pain.     Summary of Evaluation: Assessment: Baldemar Miller Andre Peterson is a 35year old male who was referred to physical therapy for treatment of sprain of the anterior talofibular ligament of the left ankle. Objective findings include decreased L ankle ROM, decreased L ankle strength, decreased ankle proprioception and impaired gait. Marcie Rodriguez will benefit from skilled therapeutic intervention in this setting to decrease pain and improve overall functional mobility. Subjective:  Pt reports he is feeling pretty good - still just has weakness, but no pain. Any changes in Ambulatory Summary Sheet? 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 Exercises: (No more than 4 columns)   Exercise/Equipment 1/27/21 #1 Date 1/31/22 #2 Date 2/5/22  #3           WARM UP                     TABLE      Gastroc stretch with towel 30\", x2 30\", x2 2 x30\"   Ankle plantarflexion with RTB  10x2 2x10 RTB and YTB   Inversion/ Eversion towel slides x10 each 10x 2 ea    Seated ankle dorsiflexion x10 10x2 2x10 YTB and RTB    Ankle DF   RTB 10x2    Ankle Inv and Evr   RTB 10x2    Seated heel raises  2x10          STANDING            Slow marches   Wearing boots, 10x,  aeromat 10x    SLS  Wearing boots, 10 sec x 3 level surface    B heel raise   1/2 FR 1x10   Slant board stretch   1x2'   Sit to stand   22\" 2x10   ANT step ups   8\" 1x15 L   LAT step ups   8\" 1x15 L                          PROPRIOCEPTION      Balance board   B F/S 60\" ea   Walk the tightrope   8 x length/cane A                     MODALITIES                      Other Therapeutic Activities/Education:    2/5/2022: Patient received education on their current pathology and how their condition effects them with their functional activities. Patient understood discussion and questions were answered.  Patient understands their activity limitations and understands rational for treatment progression. Home Exercise Program:  HEP created and reviewed  Reviewed and added TB ankle EVR,INV, DF and seated heel raises. Provided with handouts. Manual Treatments:        Modalities:        Communication with other providers:  POC sent for cosign on 1/27/22      Assessment:  (Response towards treatment session) (Pain Rating) Pt is able to demonstrate good tolerance to today's session with no adverse reactions noted. Pt is instructed with all listed activities and is able to demo adequate to good form throughout session today. Pt will benefit from skilled therapeutic intervention in this setting to decrease pain and improve overall functional mobility. End pain 0/10    Assessment: Sarah Hanks is a 35year old male who was referred to physical therapy for treatment of sprain of the anterior talofibular ligament of the left ankle. Objective findings include decreased L ankle ROM, decreased L ankle strength, decreased ankle proprioception and impaired gait. Cece Lobo will benefit from skilled therapeutic intervention in this setting to decrease pain and improve overall functional mobility.       Plan for Next Session:  Progress ankle ROM activities, progress strengthening, start weight bearing in bars, gait training      Time In / Time Out:    1423/1503       If [facility] Please Indicate Time In/Out/Total Time  CPT Code Time in Time out Total Time   Eval       There ex 29256  1423 1503 40   ADL 44537                                     Total for session      40       Timed Code/Total Treatment Minutes: TE X 40' X 3      Next Progress Note due:  10th visit      Plan of Care Interventions:  [x] Therapeutic Exercise  [x] Modalities:  [x] Therapeutic Activity     [] Ultrasound  [x] Estim  [x] Gait Training      [] Cervical Traction [] Lumbar Traction  [x] Neuromuscular Re-education    [x] Cold/hotpack [] Iontophoresis   [x] Instruction in HEP      [x] Vasopneumatic   [] Dry Needling    [x] Manual Therapy               [] Aquatic Therapy              Electronically signed by:  Manpreet Jung II, PTA [id number]       2/5/2022, 8:07 AM

## 2022-02-07 ENCOUNTER — HOSPITAL ENCOUNTER (OUTPATIENT)
Dept: PHYSICAL THERAPY | Age: 42
Setting detail: THERAPIES SERIES
Discharge: HOME | End: 2022-02-07
Payer: COMMERCIAL

## 2022-02-07 PROCEDURE — 97110 THERAPEUTIC EXERCISES: CPT

## 2022-02-07 PROCEDURE — 97112 NEUROMUSCULAR REEDUCATION: CPT

## 2022-02-07 NOTE — FLOWSHEET NOTE
Outpatient Physical Therapy  [place]           [x] Phone: [phone]   Fax: [phone]  [facility]           [] Phone: [phone]   Fax: [phone]        Physical Therapy Daily Treatment Note  Date:  2022    Patient Name:  Sobia Prado    :  1980  MRN: [id number]  Restrictions/Precautions:    Diagnosis:   Diagnosis: Sprain of Anterior Talofibular ligament of left ankle  Date of Injury/Surgery:   Treatment Diagnosis: Treatment Diagnosis: Sprain of anterior talofibular ligament of left ankle;  Left ankle pain; abnormalities of gait and mobility    Insurance/Certification information: PT Insurance Information: Worker's Comp-  12 visits by 22   Referring Physician:  Referring Practitioner: Dr. Baldemar Gilbert  Next Doctor Visit:    Plan of care signed (Y/N):  pending  Outcome Measure:   Visit# / total visits:     Pain level: 0/10 Not hurting currently, pain increases when walking on hilly or uneven surfaces    Goals:     Patient goals : Improve strength, improve walking, return to working out  Short term goals  Time Frame for Short term goals: 4 weeks  Short term goal 1: Patient will report compliance with HEP. Short term goal 2: Patient will present with improved L ankle AROM to 30 degrees plantarflexion, 12 degrees dorsiflexion, 25 degrees inversion, and 20 degrees eversion. Short term goal 3: Patient will report a decrease in L ankle pain at the end of the work day from 7/10 to 3/10. Short term goal 4: Patient will present with improved gait pattern with improved toe off. Long term goals  Time Frame for Long term goals : 10 weeks  Long term goal 1: Patient will present with improved L ankle AROM to WNL. Long term goal 2: Patient will present with improved L ankle strength to 5/5 globally. Long term goal 3: Patient will present with normalized gait pattern and ability to perform a SLS for > 20 seconds.   Long term goal 4: Patient will report an improved ability to complete a 12 hour shift without pain. Summary of Evaluation: Assessment: Jorge Good is a 35year old male who was referred to physical therapy for treatment of sprain of the anterior talofibular ligament of the left ankle. Objective findings include decreased L ankle ROM, decreased L ankle strength, decreased ankle proprioception and impaired gait. Izzy Hicks will benefit from skilled therapeutic intervention in this setting to decrease pain and improve overall functional mobility. Subjective:  Pt is not wearing his boot at work. He reports feelings of weakness, however no significant pain. He experiences some pain when walking on uneven and hilly surfaces. Izzy Hicks states that he is improving with his home exercises and is now able to perform ankle inversion using a band. Any changes in Ambulatory Summary Sheet? 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       Exercises: (No more than 4 columns)   Exercise/Equipment Date 1/31/22 #2 Date 2/5/22  #3 2/7/22 #4           WARM UP      Recumbent bike      5 mins         TABLE      Gastroc stretch with towel 30\", x2 2 x30\" 2 x30\"   Ankle plantarflexion with RTB 10x2 2x10 RTB and YTB 2x10 RTB and YTB   Inversion/ Eversion towel slides 10x 2 ea     Seated ankle dorsiflexion 10x2 2x10 YTB and RTB 2x10 YTB and RTB    Ankle DF  RTB 10x2  RTB 10x2   Ankle Inv and Evr  RTB 10x2  RTB 10x2   Seated heel raises 2x10  2x10   Toe curls   x4  Pillowcase on board                     STANDING            Slow marches  Wearing boots, 10x,  aeromat 10x  On foam mat without shoes  2x10   SLS Wearing boots, 10 sec x 3 level surface  Without shoe  10\" x3   B heel raise  1/2 FR 1x10 1/2 FR 1x10   Slant board stretch  1x2' 30\" x2   Sit to stand  22\" 2x10 22\" 2x10   ANT step ups  8\" 1x15 L 8\" 2x10 L   LAT step ups  8\" 1x15 L 8\" 2x10 L   Heel raises   X10, focus on eccentric lowering                    PROPRIOCEPTION      Balance board  B F/S 60\" ea B F/S 30\"x2 ea   Walk the tightrope  8 x length/cane A Tandem walking  3 laps   Lateral steps on foam   30\" x2               MODALITIES                      Other Therapeutic Activities/Education:    2/7/2022: Patient received education on their current pathology and how their condition effects them with their functional activities. Patient understood discussion and questions were answered. Patient understands their activity limitations and understands rational for treatment progression. Home Exercise Program:  HEP created and reviewed  Reviewed and added TB ankle EVR,INV, DF and seated heel raises. Provided with handouts. Manual Treatments:        Modalities:        Communication with other providers:  POC sent for cosign on 1/27/22      Assessment:  (Response towards treatment session) (Pain Rating) [place]on with a good tolerance towards today's session. Patient able to complete all standing balance activities without loss of balance. He does present with some difficulty performing standing heel raises in full range. Progress strengthening as appropriate. Pt will benefit from skilled therapeutic intervention in this setting to decrease pain and improve overall functional mobility. End pain 0/10    Assessment: Omer Varma is a 35year old male who was referred to physical therapy for treatment of sprain of the anterior talofibular ligament of the left ankle. Objective findings include decreased L ankle ROM, decreased L ankle strength, decreased ankle proprioception and impaired gait. Vic Nicole will benefit from skilled therapeutic intervention in this setting to decrease pain and improve overall functional mobility.       Plan for Next Session:  Progress ankle ROM activities, progress strengthening, start weight bearing in bars, gait training      Time In / Time Out:   6442-7693       If BWC Please Indicate Time In/Out/Total Time  CPT Code Time in Time out Total Time   Eval       There ex 68314  1443 1413 30   ADL 32561       Neuro 55631  1413  1433  20                        Total for session            Timed Code/Total Treatment Minutes:     50'/50'    There ex 2, Neuro 1      Next Progress Note due:  10th visit      Plan of Care Interventions:  [x] Therapeutic Exercise  [x] Modalities:  [x] Therapeutic Activity     [] Ultrasound  [x] Estim  [x] Gait Training      [] Cervical Traction [] Lumbar Traction  [x] Neuromuscular Re-education    [x] Cold/hotpack [] Iontophoresis   [x] Instruction in HEP      [x] Vasopneumatic   [] Dry Needling    [x] Manual Therapy               [] Aquatic Therapy              Electronically signed by:  Ryan Baires, PT       2/7/2022, 3:36 PM

## 2022-02-10 ENCOUNTER — HOSPITAL ENCOUNTER (OUTPATIENT)
Dept: PHYSICAL THERAPY | Age: 42
Setting detail: THERAPIES SERIES
Discharge: HOME | End: 2022-02-10
Payer: COMMERCIAL

## 2022-02-10 PROCEDURE — 97112 NEUROMUSCULAR REEDUCATION: CPT

## 2022-02-10 PROCEDURE — 97110 THERAPEUTIC EXERCISES: CPT

## 2022-02-10 NOTE — FLOWSHEET NOTE
Outpatient Physical Therapy  [place]           [x] Phone: [phone]   Fax: [phone]  Berenice Asif           [] Phone: [phone]   Fax: [phone]        Physical Therapy Daily Treatment Note  Date:  2/10/2022    Patient Name:  Delisa Cedeno    :  1980  MRN: [id number]  Restrictions/Precautions:    Diagnosis:   Diagnosis: Sprain of Anterior Talofibular ligament of left ankle  Date of Injury/Surgery:   Treatment Diagnosis: Treatment Diagnosis: Sprain of anterior talofibular ligament of left ankle;  Left ankle pain; abnormalities of gait and mobility    Insurance/Certification information: PT Insurance Information: Worker's Comp-  12 visits by 22   Referring Physician:  Referring Practitioner: Dr. Sujey Sen  Next Doctor Visit:    Plan of care signed (Y/N):  pending  Outcome Measure:   Visit# / total visits:     Pain level: 0/10 Not hurting currently, pain increases when walking on hilly or uneven surfaces    Goals:     Patient goals : Improve strength, improve walking, return to working out  Short term goals  Time Frame for Short term goals: 4 weeks  Short term goal 1: Patient will report compliance with HEP. Short term goal 2: Patient will present with improved L ankle AROM to 30 degrees plantarflexion, 12 degrees dorsiflexion, 25 degrees inversion, and 20 degrees eversion. Short term goal 3: Patient will report a decrease in L ankle pain at the end of the work day from 7/10 to 3/10. Short term goal 4: Patient will present with improved gait pattern with improved toe off. Long term goals  Time Frame for Long term goals : 10 weeks  Long term goal 1: Patient will present with improved L ankle AROM to WNL. Long term goal 2: Patient will present with improved L ankle strength to 5/5 globally. Long term goal 3: Patient will present with normalized gait pattern and ability to perform a SLS for > 20 seconds.   Long term goal 4: Patient will report an improved ability to complete a 12 hour shift without pain. Summary of Evaluation: Assessment: Arch Staff is a 35year old male who was referred to physical therapy for treatment of sprain of the anterior talofibular ligament of the left ankle. Objective findings include decreased L ankle ROM, decreased L ankle strength, decreased ankle proprioception and impaired gait. Maxime Potts will benefit from skilled therapeutic intervention in this setting to decrease pain and improve overall functional mobility. Subjective:  Pt reports of no pain today but was a little achy after previous session. Any changes in Ambulatory Summary Sheet? 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       Exercises: (No more than 4 columns)   Exercise/Equipment Date 1/31/22 #2 Date 2/5/22  #3 2/7/22 #4 2/10/22 #5            WARM UP       Recumbent bike      5 mins 5 min          TABLE       Gastroc stretch with towel 30\", x2 2 x30\" 2 x30\" --   Ankle plantarflexion with RTB 10x2 2x10 RTB and YTB 2x10 RTB and YTB GTB 10x2   Inversion/ Eversion towel slides 10x 2 ea      Seated ankle dorsiflexion 10x2 2x10 YTB and RTB 2x10 YTB and RTB 10x2 GTB    Ankle DF  RTB 10x2  RTB 10x2 10x2 GTB   Ankle Inv and Evr  RTB 10x2  RTB 10x2 10x2 GTB   Seated heel raises 2x10  2x10 ---   Toe curls   x4  Pillowcase on board 10 reps x 4 using towel on board                         STANDING              Slow marches  Wearing boots, 10x,  aeromat 10x  On foam mat without shoes  2x10 On foam mat without shoes  2x10   SLS Wearing boots, 10 sec x 3 level surface  Without shoe  10\" x3 Without shoe  10\" x3   B heel raise  1/2 FR 1x10 1/2 FR 1x10    Slant board stretch  1x2' 30\" x2 FR 30\" x 2   Sit to stand  22\" 2x10 22\" 2x10 22\" 2x10   ANT step ups  8\" 1x15 L 8\" 2x10 L 8\" 2x10 L LAT step ups  8\" 1x15 L 8\" 2x10 L 8\" 2x10 L   Heel raises   X10, focus on eccentric lowering 10x2 focus on eccentric lowering                      PROPRIOCEPTION       Balance board  B F/S 60\" ea B F/S 30\"x2 ea B F/S 30\"x2 ea   Walk the tightrope  8 x length/cane A Tandem walking  3 laps Foam beam 3 laps   Lateral steps on foam   30\" x2 3 laps                 MODALITIES                         Other Therapeutic Activities/Education:    2/10/2022: Patient received education on their current pathology and how their condition effects them with their functional activities. Patient understood discussion and questions were answered. Patient understands their activity limitations and understands rational for treatment progression. Home Exercise Program:  HEP created and reviewed  Reviewed and added TB ankle EVR,INV, DF and seated heel raises. Provided with handouts. Manual Treatments:        Modalities:        Communication with other providers:  POC sent for cosign on 1/27/22      Assessment:  (Response towards treatment session) (Pain Rating) Haylie Edgar demonstrated good tolerance to today's session. Continues to present with some difficulty performing standing heel raises in full range. Progress strengthening as appropriate. Pt will benefit from skilled therapeutic intervention in this setting to decrease pain and improve overall functional mobility. End pain 0/10    Assessment: Macario Brooks is a 35year old male who was referred to physical therapy for treatment of sprain of the anterior talofibular ligament of the left ankle. Objective findings include decreased L ankle ROM, decreased L ankle strength, decreased ankle proprioception and impaired gait. Haylie Edgar will benefit from skilled therapeutic intervention in this setting to decrease pain and improve overall functional mobility.       Plan for Next Session:  Progress ankle ROM activities, progress strengthening, start weight bearing in bars, gait training      Time In / Time Out:   0376-0818       If [facility] Please Indicate Time In/Out/Total Time  CPT Code Time in Time out Total Time   Eval       There ex [id number]  1343 1413 30   ADL 08837       Neuro [id number]  1413  1433  20                        Total for session     50       Timed Code/Total Treatment Minutes:     50'/50'    (2) TE, (1) NR      Next Progress Note due:  10th visit      Plan of Care Interventions:  [x] Therapeutic Exercise  [x] Modalities:  [x] Therapeutic Activity     [] Ultrasound  [x] Estim  [x] Gait Training      [] Cervical Traction [] Lumbar Traction  [x] Neuromuscular Re-education    [x] Cold/hotpack [] Iontophoresis   [x] Instruction in HEP      [x] Vasopneumatic   [] Dry Needling    [x] Manual Therapy               [] Aquatic Therapy              Electronically signed by:  Jeffry Valdez PTA     2/10/2022, 1:43 PM

## 2022-02-14 ENCOUNTER — HOSPITAL ENCOUNTER (OUTPATIENT)
Dept: PHYSICAL THERAPY | Age: 42
Setting detail: THERAPIES SERIES
Discharge: HOME | End: 2022-02-14
Payer: COMMERCIAL

## 2022-02-14 PROCEDURE — 97112 NEUROMUSCULAR REEDUCATION: CPT

## 2022-02-14 PROCEDURE — 97110 THERAPEUTIC EXERCISES: CPT

## 2022-02-14 NOTE — FLOWSHEET NOTE
Outpatient Physical Therapy  [place]           [x] Phone: [phone]   Fax: [phone]  Altagracia Estrada           [] Phone: [phone]   Fax: [phone]        Physical Therapy Daily Treatment Note  Date:  2022    Patient Name:  Ronnell Marquez    :  1980  MRN: [id number]  Restrictions/Precautions:    Diagnosis:   Diagnosis: Sprain of Anterior Talofibular ligament of left ankle  Date of Injury/Surgery:   Treatment Diagnosis: Treatment Diagnosis: Sprain of anterior talofibular ligament of left ankle;  Left ankle pain; abnormalities of gait and mobility    Insurance/Certification information: PT Insurance Information: Worker's Comp-  12 visits by 22   Referring Physician:  Referring Practitioner: Dr. Sonu Montgomery  Next Doctor Visit:    Plan of care signed (Y/N):  pending  Outcome Measure:   Visit# / total visits:     Pain level: 1-2/10     Goals:     Patient goals : Improve strength, improve walking, return to working out  Short term goals  Time Frame for Short term goals: 4 weeks  Short term goal 1: Patient will report compliance with HEP. Short term goal 2: Patient will present with improved L ankle AROM to 30 degrees plantarflexion, 12 degrees dorsiflexion, 25 degrees inversion, and 20 degrees eversion. Short term goal 3: Patient will report a decrease in L ankle pain at the end of the work day from 7/10 to 3/10. Short term goal 4: Patient will present with improved gait pattern with improved toe off. Long term goals  Time Frame for Long term goals : 10 weeks  Long term goal 1: Patient will present with improved L ankle AROM to WNL. Long term goal 2: Patient will present with improved L ankle strength to 5/5 globally. Long term goal 3: Patient will present with normalized gait pattern and ability to perform a SLS for > 20 seconds. Long term goal 4: Patient will report an improved ability to complete a 12 hour shift without pain.     Summary of Evaluation: Assessment: Bruce Albright is a 35year old male who was referred to physical therapy for treatment of sprain of the anterior talofibular ligament of the left ankle. Objective findings include decreased L ankle ROM, decreased L ankle strength, decreased ankle proprioception and impaired gait. Owen Caceres will benefit from skilled therapeutic intervention in this setting to decrease pain and improve overall functional mobility. Subjective:  Pt states his ankle is a little achy today. Not sure why. Any changes in Ambulatory Summary Sheet? 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   L ankle PF 4+/5,   L ankle Evr 4+/5  Exercises: (No more than 4 columns)   Exercise/Equipment 2/7/22 #4 2/10/22 #5 2/14/22 #6           WARM UP      Recumbent bike    5 mins 5 min 5 min         TABLE      Gastroc stretch with towel 2 x30\" --    Ankle plantarflexion with RTB 2x10 RTB and YTB GTB 10x2 GTB 10x2   Inversion/ Eversion towel slides      Seated ankle dorsiflexion 2x10 YTB and RTB 10x2 GTB 10x2 GTB    Ankle DF  RTB 10x2 10x2 GTB 10x2 GTB   Ankle Inv and Evr  RTB 10x2 10x2 GTB 10x2 GTB   Seated heel raises 2x10 ---    Toe curls x4  Pillowcase on board 10 reps x 4 using towel on board                       STANDING            Slow marches  On foam mat without shoes  2x10 On foam mat without shoes  2x10 On foam mat without shoes  2x10   SLS Without shoe  10\" x3 Without shoe  10\" x3 Without shoe  10\" x3   B heel raise 1/2 FR 1x10     Slant board stretch 30\" x2 FR 30\" x 2 FR 30\" x 2   Sit to stand 22\" 2x10 22\" 2x10 21\" 2x10   ANT step ups 8\" 2x10 L 8\" 2x10 L 8\" 2x10 L   LAT step ups 8\" 2x10 L 8\" 2x10 L 8\" 2x10 L   Heel raises X10, focus on eccentric lowering 10x2 focus on eccentric lowering 10x2 focus on eccentric lowering PROPRIOCEPTION      Balance board B F/S 30\"x2 ea B F/S 30\"x2 ea B F/S 30\"x2 ea   Walk the tightrope Tandem walking  3 laps Foam beam 3 laps Foam beam 3 laps   Lateral steps on foam 30\" x2 3 laps 3 laps               MODALITIES                      Other Therapeutic Activities/Education:    2/14/2022: Patient received education on their current pathology and how their condition effects them with their functional activities. Patient understood discussion and questions were answered. Patient understands their activity limitations and understands rational for treatment progression. Home Exercise Program:  HEP created and reviewed  Reviewed and added TB ankle EVR,INV, DF and seated heel raises. Provided with handouts. Manual Treatments:        Modalities:        Communication with other providers:  POC sent for cosign on 1/27/22      Assessment:  (Response towards treatment session) (Pain Rating) Aniket Dior demonstrated good tolerance to today's session with less pain following rating 0/10. Continues to present with some difficulty performing standing heel raises in full range. Progress strengthening as appropriate. Pt will benefit from skilled therapeutic intervention in this setting to decrease pain and improve overall functional mobility. End pain 0/10    Assessment: Viola Melendez is a 35year old male who was referred to physical therapy for treatment of sprain of the anterior talofibular ligament of the left ankle. Objective findings include decreased L ankle ROM, decreased L ankle strength, decreased ankle proprioception and impaired gait. Aniket Dior will benefit from skilled therapeutic intervention in this setting to decrease pain and improve overall functional mobility.       Plan for Next Session:  Progress ankle ROM activities, progress strengthening, start weight bearing in bars, gait training      Time In / Time Out:   8537-7512       If [facility] Please Indicate Time In/Out/Total Time  CPT Code Time in Time out Total Time   Eval       There ex [id number]  1348 1412 24   ADL [id number]       Neuro 98364  1412  1532  20                        Total for session     44       Timed Code/Total Treatment Minutes:    44/44   (2) TE, (1) NR      Next Progress Note due:  10th visit      Plan of Care Interventions:  [x] Therapeutic Exercise  [x] Modalities:  [x] Therapeutic Activity     [] Ultrasound  [x] Estim  [x] Gait Training      [] Cervical Traction [] Lumbar Traction  [x] Neuromuscular Re-education    [x] Cold/hotpack [] Iontophoresis   [x] Instruction in HEP      [x] Vasopneumatic   [] Dry Needling    [x] Manual Therapy               [] Aquatic Therapy              Electronically signed by:  Marlon Bender PTA     2/14/2022, 1:48 PM

## 2022-02-17 ENCOUNTER — HOSPITAL ENCOUNTER (OUTPATIENT)
Dept: PHYSICAL THERAPY | Age: 42
Setting detail: THERAPIES SERIES
Discharge: HOME | End: 2022-02-17
Payer: COMMERCIAL

## 2022-02-17 PROCEDURE — 97110 THERAPEUTIC EXERCISES: CPT

## 2022-02-17 PROCEDURE — 97112 NEUROMUSCULAR REEDUCATION: CPT

## 2022-02-17 NOTE — FLOWSHEET NOTE
Outpatient Physical Therapy  [place]           [x] Phone: [phone]   Fax: [phone]  Linda Parish           [] Phone: [phone]   Fax: [phone]        Physical Therapy Daily Treatment Note  Date:  2022    Patient Name:  Caitlyn Cadet    :  1980  MRN: [id number]  Restrictions/Precautions:    Diagnosis:   Diagnosis: Sprain of Anterior Talofibular ligament of left ankle  Date of Injury/Surgery:   Treatment Diagnosis: Treatment Diagnosis: Sprain of anterior talofibular ligament of left ankle;  Left ankle pain; abnormalities of gait and mobility    Insurance/Certification information: PT Insurance Information: Worker's [facility]-  12 visits by 22   Referring Physician:  Referring Practitioner: Dr. Pamela Harman Doctor Visit:    Plan of care signed (Y/N):  yes  Outcome Measure:    Visit# / total visits:     Pain level: 1-2/10     Goals:     Patient goals : Improve strength, improve walking, return to working out  Short term goals  Time Frame for Short term goals: 4 weeks  Short term goal 1: Patient will report compliance with HEP. Short term goal 2: Patient will present with improved L ankle AROM to 30 degrees plantarflexion, 12 degrees dorsiflexion, 25 degrees inversion, and 20 degrees eversion. Short term goal 3: Patient will report a decrease in L ankle pain at the end of the work day from 7/10 to 3/10. Short term goal 4: Patient will present with improved gait pattern with improved toe off. Long term goals  Time Frame for Long term goals : 10 weeks  Long term goal 1: Patient will present with improved L ankle AROM to WNL. Long term goal 2: Patient will present with improved L ankle strength to 5/5 globally. Long term goal 3: Patient will present with normalized gait pattern and ability to perform a SLS for > 20 seconds. Long term goal 4: Patient will report an improved ability to complete a 12 hour shift without pain.     Summary of Evaluation: Assessment: Sarah Hanks is a 35year old male who was referred to physical therapy for treatment of sprain of the anterior talofibular ligament of the left ankle. Objective findings include decreased L ankle ROM, decreased L ankle strength, decreased ankle proprioception and impaired gait. Cece Lobo will benefit from skilled therapeutic intervention in this setting to decrease pain and improve overall functional mobility. Subjective:    Cece Lobo states that he can make it through his work day without an increase in pain. However, he has problems running and exercising with his punching bag. Any changes in Ambulatory Summary Sheet? 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   L ankle PF 4+/5,   L ankle Evr 4+/5        Exercises: (No more than 4 columns)   Exercise/Equipment 2/10/22 #5 2/14/22 #6 2/17/22 #7           WARM UP      Recumbent bike    5 min 5 min 5 min         TABLE      Gastroc stretch with towel --     Ankle plantarflexion with RTB GTB 10x2 GTB 10x2 GTB 10x3   Inversion/ Eversion towel slides      Seated ankle dorsiflexion 10x2 GTB 10x2 GTB 10x2 GTB    Ankle DF  10x2 GTB 10x2 GTB 10x2 GTB   Ankle Inv and Evr  10x2 GTB 10x2 GTB 10x2 GTB   Seated heel raises ---     Toe curls 10 reps x 4 using towel on board                        STANDING            Slow marches  On foam mat without shoes  2x10 On foam mat without shoes  2x10 On foam mat without shoes  2x10   SLS Without shoe  10\" x3 Without shoe  10\" x3 Without shoe  10\" x3   B heel raise      Slant board stretch FR 30\" x 2 FR 30\" x 2 FR 30\" x 2   Sit to stand 22\" 2x10 21\" 2x10 21\" 2x10   ANT step ups 8\" 2x10 L 8\" 2x10 L 8\" 2x10 L   LAT step ups 8\" 2x10 L 8\" 2x10 L 8\" 2x10 L   Heel raises 10x2 focus on eccentric lowering 10x2 focus on eccentric lowering 10x2 focus on eccentric lowering                    PROPRIOCEPTION      Balance board B F/S 30\"x2 ea B F/S 30\"x2 ea B F/S 30\"x2 ea   Walk the tightrope Foam beam 3 laps Foam beam 3 laps Foam beam 3 laps   Lateral steps on foam 3 laps 3 laps 3 laps               MODALITIES                      Other Therapeutic Activities/Education:    2/17/2022: Patient received education on their current pathology and how their condition effects them with their functional activities. Patient understood discussion and questions were answered. Patient understands their activity limitations and understands rational for treatment progression. Home Exercise Program:  HEP created and reviewed  Reviewed and added TB ankle EVR,INV, DF and seated heel raises. Provided with handouts. Manual Treatments:        Modalities:        Communication with other providers:  POC sent for cosign on 1/27/22      Assessment:  (Response towards treatment session) (Pain Rating) Kendell Ahn demonstrated a good tolerance towards today's session. Pelon with improved standing dynamic balance. He does present with some ankle weakness and has some trouble with standing heel raises. Pt will continue to benefit from skilled therapeutic intervention in this setting to decrease pain and improve overall functional mobility. End pain 0/10    Assessment: Mima Smith is a 35year old male who was referred to physical therapy for treatment of sprain of the anterior talofibular ligament of the left ankle. Objective findings include decreased L ankle ROM, decreased L ankle strength, decreased ankle proprioception and impaired gait. Kendell Ahn will benefit from skilled therapeutic intervention in this setting to decrease pain and improve overall functional mobility.       Plan for Next Session:  Progress ankle ROM activities, progress strengthening, start weight bearing in bars, gait training      Time In / Time Out:   3100-2943       If [facility] Please Indicate

## 2022-02-21 ENCOUNTER — HOSPITAL ENCOUNTER (OUTPATIENT)
Dept: PHYSICAL THERAPY | Age: 42
Discharge: HOME | End: 2022-02-21

## 2022-02-21 NOTE — FLOWSHEET NOTE
Physical Therapy  Cancellation/No-show Note  Patient Name:  Ezio Waggoner  :  1980   Date:  2022  Cancelled visits to date: 2  No-shows to date: 0    For today's appointment patient:  [x]  Cancelled  []  Rescheduled appointment  []  No-show     Reason given by patient:  []  Patient ill  [x]  Conflicting appointment  []  No transportation    []  Conflict with work  []  No reason given  []  Other:     Comments:      Electronically signed by:  Alton Jiang 2022, 2:12 PM

## 2022-02-22 ENCOUNTER — HOSPITAL ENCOUNTER (OUTPATIENT)
Dept: PHYSICAL THERAPY | Age: 42
Setting detail: THERAPIES SERIES
Discharge: HOME | End: 2022-02-22
Payer: COMMERCIAL

## 2022-02-22 PROCEDURE — 97112 NEUROMUSCULAR REEDUCATION: CPT

## 2022-02-22 PROCEDURE — 97110 THERAPEUTIC EXERCISES: CPT

## 2022-02-22 NOTE — FLOWSHEET NOTE
Outpatient Physical Therapy  [place]           [x] Phone: [phone]   Fax: [phone]  [place]           [] Phone: [phone]   Fax: [phone]        Physical Therapy Daily Treatment Note  Date:  2022    Patient Name:  Cassandra Barbosa    :  1980  MRN: [id number]  Restrictions/Precautions:    Diagnosis:   Diagnosis: Sprain of Anterior Talofibular ligament of left ankle  Date of Injury/Surgery:   Treatment Diagnosis: Treatment Diagnosis: Sprain of anterior talofibular ligament of left ankle;  Left ankle pain; abnormalities of gait and mobility    Insurance/Certification information: PT Insurance Information: Worker's Comp-  12 visits by 22   Referring Physician:  Referring Practitioner: Dr. Johnnie Urban  Next Doctor Visit:    Plan of care signed (Y/N):  yes  Outcome Measure:    Visit# / total visits:     Pain level: 0/10     Goals:     Patient goals : Improve strength, improve walking, return to working out  Short term goals  Time Frame for Short term goals: 4 weeks  Short term goal 1: Patient will report compliance with HEP. Short term goal 2: Patient will present with improved L ankle AROM to 30 degrees plantarflexion, 12 degrees dorsiflexion, 25 degrees inversion, and 20 degrees eversion. Short term goal 3: Patient will report a decrease in L ankle pain at the end of the work day from 7/10 to 3/10. Short term goal 4: Patient will present with improved gait pattern with improved toe off. Long term goals  Time Frame for Long term goals : 10 weeks  Long term goal 1: Patient will present with improved L ankle AROM to WNL. Long term goal 2: Patient will present with improved L ankle strength to 5/5 globally. Long term goal 3: Patient will present with normalized gait pattern and ability to perform a SLS for > 20 seconds. Long term goal 4: Patient will report an improved ability to complete a 12 hour shift without pain.     Summary of Evaluation: Assessment: Annalisa Vazquez Fantasma Tenorio is a 35year old male who was referred to physical therapy for treatment of sprain of the anterior talofibular ligament of the left ankle. Objective findings include decreased L ankle ROM, decreased L ankle strength, decreased ankle proprioception and impaired gait. Lani Menendez will benefit from skilled therapeutic intervention in this setting to decrease pain and improve overall functional mobility. Subjective:    Lani Menendez states work is going well. Would like to be able to return to running and exercising with his punching bag. Any changes in Ambulatory Summary Sheet? 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   L ankle PF 4+/5,   L ankle Evr 4+/5        Exercises: (No more than 4 columns)   Exercise/Equipment 2/10/22 #5 2/14/22 #6 2/17/22 #7 2/22/22 #8            WARM UP       Recumbent bike    5 min 5 min 5 min WL 5 5 min          TABLE       Gastroc stretch with towel --      Ankle plantarflexion with RTB GTB 10x2 GTB 10x2 GTB 10x3    Inversion/ Eversion towel slides       Seated ankle dorsiflexion 10x2 GTB 10x2 GTB 10x2 GTB     Ankle DF  10x2 GTB 10x2 GTB 10x2 GTB    Ankle Inv and Evr  10x2 GTB 10x2 GTB 10x2 GTB    Seated heel raises ---      Toe curls 10 reps x 4 using towel on board                            STANDING              Slow marches  On foam mat without shoes  2x10 On foam mat without shoes  2x10 On foam mat without shoes  2x10    SLS Without shoe  10\" x3 Without shoe  10\" x3 Without shoe  10\" x3 Without shoe  15\" x3          Slant board stretch FR 30\" x 2 FR 30\" x 2 FR 30\" x 2 FR 30\" x 2   Sit to stand 22\" 2x10 21\" 2x10 21\" 2x10    ANT step ups 8\" 2x10 L 8\" 2x10 L 8\" 2x10 L 8\" 2x10 L   LAT step ups 8\" 2x10 L 8\" 2x10 L 8\" 2x10 L 8\" 2x10 L   Heel raises 10x2 focus on eccentric lowering 10x2 focus on eccentric lowering 10x2 focus on eccentric lowering 10x2 edge of // bars                       PROPRIOCEPTION       Balance board B F/S 30\"x2 ea B F/S 30\"x2 ea B F/S 30\"x2 ea B F/S 30\"x2 ea   Walk the tightrope Foam beam 3 laps Foam beam 3 laps Foam beam 3 laps Foam beam 3 laps   Lateral steps on foam 3 laps 3 laps 3 laps 3 laps   L SLS with 3 way touch    10x touching bar as needed for balance   Mini trampoline    Marches, easy DL hop  Light jog 10x ea   MODALITIES                         Other Therapeutic Activities/Education:    2/22/2022: Patient received education on their current pathology and how their condition effects them with their functional activities. Patient understood discussion and questions were answered. Patient understands their activity limitations and understands rational for treatment progression. Home Exercise Program:  HEP created and reviewed  Reviewed and added TB ankle EVR,INV, DF and seated heel raises. Provided with handouts. Manual Treatments:        Modalities:        Communication with other providers:  POC sent for cosign on 1/27/22      Assessment:  (Response towards treatment session) (Pain Rating) Nancy Frank demonstrated overall good tolerance to  today's session. Standing dynamic balance improving. He does present with some ankle weakness and has some trouble with standing heel raises. Pt will continue to benefit from skilled therapeutic intervention in this setting to decrease pain and improve overall functional mobility. End pain 0/10 - reports of muscle fatigue     Assessment: Luiza Ford is a 35year old male who was referred to physical therapy for treatment of sprain of the anterior talofibular ligament of the left ankle. Objective findings include decreased L ankle ROM, decreased L ankle strength, decreased ankle proprioception and impaired gait.  Nancy Frank will benefit from skilled therapeutic intervention in this setting to decrease pain and improve overall functional mobility.       Plan for Next Session:  Progress ankle ROM activities, progress strengthening, start weight bearing in bars, gait training      Time In / Time Out:   7035-5388       If [facility] Please Indicate Time In/Out/Total Time  CPT Code Time in Time out Total Time   Eval       There ex [id number]  1530 1555 25   ADL [id number]       Neuro [id number]  1555 1610 15                      Total for session    40       Timed Code/Total Treatment Minutes:    40/40, (2) TE, (1) NR      Next Progress Note due:  10th visit      Plan of Care Interventions:  [x] Therapeutic Exercise  [x] Modalities:  [x] Therapeutic Activity     [] Ultrasound  [x] Estim  [x] Gait Training      [] Cervical Traction [] Lumbar Traction  [x] Neuromuscular Re-education    [x] Cold/hotpack [] Iontophoresis   [x] Instruction in HEP      [x] Vasopneumatic   [] Dry Needling    [x] Manual Therapy               [] Aquatic Therapy              Electronically signed by:  David Garza PTA     2/22/2022, 3:30 PM

## 2022-02-24 ENCOUNTER — HOSPITAL ENCOUNTER (OUTPATIENT)
Dept: PHYSICAL THERAPY | Age: 42
Setting detail: THERAPIES SERIES
Discharge: HOME | End: 2022-02-24
Payer: COMMERCIAL

## 2022-02-24 PROCEDURE — 97110 THERAPEUTIC EXERCISES: CPT

## 2022-02-24 PROCEDURE — 97112 NEUROMUSCULAR REEDUCATION: CPT

## 2022-02-24 NOTE — FLOWSHEET NOTE
Outpatient Physical Therapy  [place]           [x] Phone: [phone]   Fax: [phone]  [place]           [] Phone: [phone]   Fax: [phone]        Physical Therapy Daily Treatment Note  Date:  2022    Patient Name:  Beckie Beal    :  1980  MRN: [id number]  Restrictions/Precautions:    Diagnosis:   Diagnosis: Sprain of Anterior Talofibular ligament of left ankle  Date of Injury/Surgery:   Treatment Diagnosis: Treatment Diagnosis: Sprain of anterior talofibular ligament of left ankle;  Left ankle pain; abnormalities of gait and mobility    Insurance/Certification information: PT Insurance Information: Worker's Comp-  12 visits by 22   Referring Physician:  Referring Practitioner: Dr. Maty Valdez  Next Doctor Visit:    Plan of care signed (Y/N):  yes  Outcome Measure:    Visit# / total visits:     Pain level: 0/10     Goals:     Patient goals : Improve strength, improve walking, return to working out  Short term goals  Time Frame for Short term goals: 4 weeks  Short term goal 1: Patient will report compliance with HEP. Short term goal 2: Patient will present with improved L ankle AROM to 30 degrees plantarflexion, 12 degrees dorsiflexion, 25 degrees inversion, and 20 degrees eversion. Short term goal 3: Patient will report a decrease in L ankle pain at the end of the work day from 7/10 to 3/10. Short term goal 4: Patient will present with improved gait pattern with improved toe off. Long term goals  Time Frame for Long term goals : 10 weeks  Long term goal 1: Patient will present with improved L ankle AROM to WNL. Long term goal 2: Patient will present with improved L ankle strength to 5/5 globally. Long term goal 3: Patient will present with normalized gait pattern and ability to perform a SLS for > 20 seconds. Long term goal 4: Patient will report an improved ability to complete a 12 hour shift without pain.     Summary of Evaluation: Assessment: Jonny Holt laps Foam beam 3 laps   Lateral steps on foam 3 laps 3 laps 3 laps   L SLS with 3 way touch  10x touching bar as needed for balance On blue stabilizer 10x   Mini trampoline  Marches, easy DL hop  Light jog 10x ea Marches 20x, easy DL hop 2x10  Light jog 2x20 ea   MODALITIES                      Other Therapeutic Activities/Education:    2/24/2022: Patient received education on their current pathology and how their condition effects them with their functional activities. Patient understood discussion and questions were answered. Patient understands their activity limitations and understands rational for treatment progression. Home Exercise Program:  HEP created and reviewed  Reviewed and added TB ankle EVR,INV, DF and seated heel raises. Provided with handouts. Manual Treatments:        Modalities:        Communication with other providers:  POC sent for cosign on 1/27/22      Assessment:  (Response towards treatment session) (Pain Rating) Helena Su demonstrated overall good tolerance to  today's session. Will continue to benefit from skilled therapeutic intervention in this setting to decrease pain and improve overall functional mobility. End pain 0/10 - reports of muscle fatigue     Assessment: Derrell Hampton is a 35year old male who was referred to physical therapy for treatment of sprain of the anterior talofibular ligament of the left ankle. Objective findings include decreased L ankle ROM, decreased L ankle strength, decreased ankle proprioception and impaired gait. Helena Su will benefit from skilled therapeutic intervention in this setting to decrease pain and improve overall functional mobility.       Plan for Next Session:  Progress ankle ROM activities, progress strengthening, start weight bearing in bars, gait training      Time In / Time Out:   4890-3962       If [facility] Please Indicate Time In/Out/Total Time  CPT Code Time in Time out Total Time   Eval       There ex [phone]   ADL 88 649 24 60 Neuro [id number]  1415 1430 15                      Total for session    42       Timed Code/Total Treatment Minutes:    42/42, (2) TE, (1) NR      Next Progress Note due:  10th visit      Plan of Care Interventions:  [x] Therapeutic Exercise  [x] Modalities:  [x] Therapeutic Activity     [] Ultrasound  [x] Estim  [x] Gait Training      [] Cervical Traction [] Lumbar Traction  [x] Neuromuscular Re-education    [x] Cold/hotpack [] Iontophoresis   [x] Instruction in HEP      [x] Vasopneumatic   [] Dry Needling    [x] Manual Therapy               [] Aquatic Therapy              Electronically signed by:  Stefanie Joseph PTA     2/24/2022, 1:48 PM

## 2022-02-28 ENCOUNTER — HOSPITAL ENCOUNTER (OUTPATIENT)
Dept: PHYSICAL THERAPY | Age: 42
Setting detail: THERAPIES SERIES
Discharge: HOME | End: 2022-02-28
Payer: COMMERCIAL

## 2022-02-28 PROCEDURE — 97112 NEUROMUSCULAR REEDUCATION: CPT

## 2022-02-28 PROCEDURE — 97110 THERAPEUTIC EXERCISES: CPT

## 2022-02-28 NOTE — FLOWSHEET NOTE
Outpatient Physical Therapy  [place]           [x] Phone: [phone]   Fax: [phone]  [place]           [] Phone: [phone]   Fax: [phone]        Physical Therapy Daily Treatment Note  Date:  2022    Patient Name:  Ezio Waggoner    :  1980  MRN: [id number]  Restrictions/Precautions:    Diagnosis:   Diagnosis: Sprain of Anterior Talofibular ligament of left ankle  Date of Injury/Surgery:   Treatment Diagnosis: Treatment Diagnosis: Sprain of anterior talofibular ligament of left ankle;  Left ankle pain; abnormalities of gait and mobility    Insurance/Certification information: PT Insurance Information: Worker's [facility]-  12 visits by 22   Referring Physician:  Referring Practitioner: Dr. Suad Gupta  Next Doctor Visit:  Goyo Reilly of care signed (Y/N):  yes  Outcome Measure:    Visit# / total visits:   10/12  Pain level: 010     Goals:     Patient goals : Improve strength, improve walking, return to working out  Short term goals  Time Frame for Short term goals: 4 weeks  Short term goal 1: Patient will report compliance with HEP. Short term goal 2: Patient will present with improved L ankle AROM to 30 degrees plantarflexion, 12 degrees dorsiflexion, 25 degrees inversion, and 20 degrees eversion. Short term goal 3: Patient will report a decrease in L ankle pain at the end of the work day from 7/10 to 3/10. Short term goal 4: Patient will present with improved gait pattern with improved toe off. Long term goals  Time Frame for Long term goals : 10 weeks  Long term goal 1: Patient will present with improved L ankle AROM to WNL. Long term goal 2: Patient will present with improved L ankle strength to 5/5 globally. Long term goal 3: Patient will present with normalized gait pattern and ability to perform a SLS for > 20 seconds. Long term goal 4: Patient will report an improved ability to complete a 12 hour shift without pain.     Summary of Evaluation: Assessment: Kristen Toribio is a 35year old male who was referred to physical therapy for treatment of sprain of the anterior talofibular ligament of the left ankle. Objective findings include decreased L ankle ROM, decreased L ankle strength, decreased ankle proprioception and impaired gait. Dominique Powell will benefit from skilled therapeutic intervention in this setting to decrease pain and improve overall functional mobility. Subjective:    Dominique Powell continuing to note improvement. States he is doing well with therapy sessions. Any changes in Ambulatory Summary Sheet? 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   L ankle PF 4+/5,   L ankle Evr 4+/5  Has difficulty stabilizing with SLS activities      Exercises: (No more than 4 columns)   Exercise/Equipment 2/22/22 #8 2/24/22 #9 2/28/22 #10           WARM UP      Recumbent bike    WL 5 5 min WL5 x 5 min WL5 x 5 min         TABLE      Gastroc stretch with towel      Ankle plantarflexion with RTB      Inversion/ Eversion towel slides      Seated ankle dorsiflexion       Ankle DF       Ankle Inv and Evr       Seated heel raises      Toe curls                        STANDING            Slow marches       SLS Without shoe  15\" x3 With ball toss - difficult, touching down between each toss rebounder 2# ball 20x         FR stretch FR 30\" x 2 1'x2 1'x2   Sit to stand      ANT step ups 8\" 2x10 L 8\" 2x10 L 8\" 2x10 L   LAT step ups 8\" 2x10 L 8\" 2x10 L 8\" 2x10 L   Heel raises 10x2 edge of // bars  10x2 edge of // bars  10x2 edge of // bars   Eccentric heelraise  Up 2 down 1  10 reps Up 2 down 1  10x2 reps              PROPRIOCEPTION      Balance board B F/S 30\"x2 ea B F/S 30\"x2 ea B F/S 30\"x2 ea   Walk the tightrope Foam beam 3 laps Foam beam 3 laps Foam beam 3 laps   Lateral steps on foam 3 laps 3 laps 3 laps   L SLS with 3 way touch 10x touching bar as needed for balance On blue stabilizer 10x On blue stabilizer 13x   Mini trampoline Marches, easy DL hop  Light jog 10x ea Marches 20x, easy DL hop 2x10  Light jog 2x20 ea Marches 30x, easy DL hop 2x10  Light jog 2x20 ea   MODALITIES                      Other Therapeutic Activities/Education:    2/28/2022: Patient received education on their current pathology and how their condition effects them with their functional activities. Patient understood discussion and questions were answered. Patient understands their activity limitations and understands rational for treatment progression. Home Exercise Program:  HEP created and reviewed  Reviewed and added TB ankle EVR,INV, DF and seated heel raises. Provided with handouts. Manual Treatments:        Modalities:        Communication with other providers:  POC sent for cosign on 1/27/22      Assessment:  (Response towards treatment session) (Pain Rating) Owen Caceres demonstrated overall good tolerance to  today's session. Still having difficulty with SLS stability. Will continue to benefit from skilled therapeutic intervention in this setting to decrease pain and improve overall functional mobility. End pain 0/10      Assessment: Bruce Albright is a 35year old male who was referred to physical therapy for treatment of sprain of the anterior talofibular ligament of the left ankle. Objective findings include decreased L ankle ROM, decreased L ankle strength, decreased ankle proprioception and impaired gait. Owen Caceres will benefit from skilled therapeutic intervention in this setting to decrease pain and improve overall functional mobility.       Plan for Next Session:  Progress ankle ROM activities, progress strengthening, start weight bearing in bars, gait training      Time In / Time Out:   1218-1539       If [facility] Please Indicate Time In/Out/Total Time  CPT Code Time in Time out Total Time   Eval There ex 20705  1434 1504 30   ADL [id number]       Neuro [id number]  1504 1519 15                      Total for session    45       Timed Code/Total Treatment Minutes:    45/45, (2) TE, (1) NR      Next Progress Note due:  10th visit      Plan of Care Interventions:  [x] Therapeutic Exercise  [x] Modalities:  [x] Therapeutic Activity     [] Ultrasound  [x] Estim  [x] Gait Training      [] Cervical Traction [] Lumbar Traction  [x] Neuromuscular Re-education    [x] Cold/hotpack [] Iontophoresis   [x] Instruction in HEP      [x] Vasopneumatic   [] Dry Needling    [x] Manual Therapy               [] Aquatic Therapy              Electronically signed by:  Elizabeth Estrada PTA     2/28/2022, 2:34 PM

## 2022-03-03 ENCOUNTER — HOSPITAL ENCOUNTER (OUTPATIENT)
Dept: PHYSICAL THERAPY | Age: 42
Setting detail: THERAPIES SERIES
Discharge: HOME | End: 2022-03-03
Payer: COMMERCIAL

## 2022-03-03 PROCEDURE — 97112 NEUROMUSCULAR REEDUCATION: CPT

## 2022-03-03 PROCEDURE — 97110 THERAPEUTIC EXERCISES: CPT

## 2022-03-03 NOTE — FLOWSHEET NOTE
Outpatient Physical Therapy  [place]           [x] Phone: [phone]   Fax: [phone]  Ayla Ibanez           [] Phone: [phone]   Fax: [phone]        Physical Therapy Daily Treatment Note  Date:  3/3/2022    Patient Name:  Nanci Clifford    :  1980  MRN: [id number]  Restrictions/Precautions:    Diagnosis:   Diagnosis: Sprain of Anterior Talofibular ligament of left ankle  Date of Injury/Surgery:   Treatment Diagnosis: Treatment Diagnosis: Sprain of anterior talofibular ligament of left ankle;  Left ankle pain; abnormalities of gait and mobility    Insurance/Certification information: PT Insurance Information: Worker's Comp-  12 visits by 22   Referring Physician:  Referring Practitioner: Dr. Sydni Baker  Next Doctor Visit:  mar  Plan of care signed (Y/N):  yes  Outcome Measure:  LEFS  44/80;   3/3/22  LEFS   Visit# / total visits:     Pain level: 2/10     Goals:     Patient goals : Improve strength, improve walking, return to working out  Short term goals  Time Frame for Short term goals: 4 weeks  Short term goal 1: Patient will report compliance with HEP. Met  Short term goal 2: Patient will present with improved L ankle AROM to 30 degrees plantarflexion, 12 degrees dorsiflexion, 25 degrees inversion, and 20 degrees eversion. Met  Short term goal 3: Patient will report a decrease in L ankle pain at the end of the work day from 7/10 to 3/10. Ongoing  5/10  Short term goal 4: Patient will present with improved gait pattern with improved toe off. Ongoing- improving      Long term goals  Time Frame for Long term goals : 10 weeks  Long term goal 1: Patient will present with improved L ankle AROM to WNL. Long term goal 2: Patient will present with improved L ankle strength to 5/5 globally. Long term goal 3: Patient will present with normalized gait pattern and ability to perform a SLS for > 20 seconds.   Long term goal 4: Patient will report an improved ability to complete a 12 hour shift without pain. Summary of Evaluation: Assessment: Omer Varma is a 35year old male who was referred to physical therapy for treatment of sprain of the anterior talofibular ligament of the left ankle. Objective findings include decreased L ankle ROM, decreased L ankle strength, decreased ankle proprioception and impaired gait. Vic Rivera will benefit from skilled therapeutic intervention in this setting to decrease pain and improve overall functional mobility. Subjective:  Vic Rivera states that he has seen improvement with physical therapy. He reports that his strength is improving, however not near where it needs to be. He is able to work without a brace and walking has improved. However, he still experiences fatigue and discomfort at work and with walking. He experiences increased pain at the end of the workday. He is still unable to run and hop, which prevents him from working out and boxing. Any changes in Ambulatory Summary Sheet?   None      Objective:  See below    COVID screening questions were asked and patient attested that there had been no contact or symptoms       LLE AROM : Exceptions  L Ankle Dorsiflexion 0-20: 12  L Ankle Plantar Flexion 0-45: 50  L Ankle Forefoot Inversion 0-40:  30  L Ankle Forefoot Eversion 0-20: 15     Strength LLE  Strength LLE: Exception  L Ankle Dorsiflexion: 5/5  L Ankle Plantar Flexion: 4+/5  L Ankle Inversion: 5/5  L Ankle Eversion: 5/5    SLS: 4 seconds, instability at the ankle    Able to perform DL heel raise        Exercises: (No more than 4 columns)   Exercise/Equipment 2/24/22 #9 2/28/22 #10 3/3/22 #11           WARM UP      Recumbent bike    WL5 x 5 min WL5 x 5 min WL5 x 5 min         TABLE      Gastroc stretch with towel      Ankle plantarflexion with RTB      Inversion/ Eversion towel slides      Seated ankle dorsiflexion       Ankle DF       Ankle Inv and Evr       Seated heel raises      Toe curls STANDING            Slow marches       SLS With ball toss - difficult, touching down between each toss rebounder 2# ball 20x          FR stretch 1'x2 1'x2    Sit to stand      ANT step ups 8\" 2x10 L 8\" 2x10 L 8\" 2x10 L   LAT step ups 8\" 2x10 L 8\" 2x10 L 8\" 2x10 L   Heel raises 10x2 edge of // bars  10x2 edge of // bars 10x2 edge of // bars   Eccentric heelraise Up 2 down 1  10 reps Up 2 down 1  10x2 reps Up 2 down 1  10x2 reps              PROPRIOCEPTION      Balance board B F/S 30\"x2 ea B F/S 30\"x2 ea B F/S 30\"x2 ea   Walk the tightrope Foam beam 3 laps Foam beam 3 laps Foam beam 3 laps   Lateral steps on foam 3 laps 3 laps 3 laps   L SLS with 3 way touch On blue stabilizer 10x On blue stabilizer 13x On blue stabilizer 13x   Mini trampoline Marches 20x, easy DL hop 2x10  Light jog 2x20 ea Marches 30x, easy DL hop 2x10  Light jog 2x20 ea -   MODALITIES                      Other Therapeutic Activities/Education:    3/3/2022: Patient received education on their current pathology and how their condition effects them with their functional activities. Patient understood discussion and questions were answered. Patient understands their activity limitations and understands rational for treatment progression. Home Exercise Program:  HEP created and reviewed  Reviewed and added TB ankle EVR,INV, DF and seated heel raises. Provided with handouts. Manual Treatments:        Modalities:        Communication with other providers:  POC sent for cosign on 1/27/22      Assessment:  (Response towards treatment session) (Pain Rating)   Progress Note  Derrell Hampton continues to work towards his goals in physical therapy. Patient has met most of his short term goals at this time and will now focus on long term goals. Patient has made good objective findings including improved L ankle AROM, improved L ankle strength and improved gait. However, patient continues to present with decreased L ankle stability with SLS.  Patient also continues to experience increased fatigue in the L ankle and increased pain towards the end of the day. Patient will continue to benefit from skilled therapeutic intervention in this setting to decrease pain and improve overall functional mobility. Assessment: Elsie Burr is a 35year old male who was referred to physical therapy for treatment of sprain of the anterior talofibular ligament of the left ankle. Objective findings include decreased L ankle ROM, decreased L ankle strength, decreased ankle proprioception and impaired gait. Marcie Rodriguez will benefit from skilled therapeutic intervention in this setting to decrease pain and improve overall functional mobility.       Plan for Next Session:  Progress ankle ROM activities, progress strengthening, start weight bearing in bars, gait training      Time In / Time Out:  [id number]       If BWC Please Indicate Time In/Out/Total Time  CPT Code Time in Time out Total Time   Eval       There ex 314[address]  1430 1455 25   ADL 49556       Neuro 81363  1455 1515 20                      Total for session           Timed Code/Total Treatment Minutes:    45/45, (2) TE, (1) NR      Next Progress Note due:  10th visit      Plan of Care Interventions:  [x] Therapeutic Exercise  [x] Modalities:  [x] Therapeutic Activity     [] Ultrasound  [x] Estim  [x] Gait Training      [] Cervical Traction [] Lumbar Traction  [x] Neuromuscular Re-education    [x] Cold/hotpack [] Iontophoresis   [x] Instruction in HEP      [x] Vasopneumatic   [] Dry Needling    [x] Manual Therapy               [] Aquatic Therapy              Electronically signed by:  Dalia Merida, PT        3/3/2022, 2:21 PM

## 2022-03-03 NOTE — PROGRESS NOTES
Outpatient Physical Therapy           [place]           [x] Phone: [phone]   Fax: [phone]  Anirudh Navarro           [] Phone: [phone]   Fax: [phone]      To:  Dr. Tremaine Rogers   From: Velma Rodarte, PT, PT     Patient: Kathrine Lombard                  : 1980  Diagnosis:    Diagnosis: Sprain of Anterior Talofibular ligament of left ankle     Date: 3/3/2022  Treatment Diagnosis:   Treatment Diagnosis: Sprain of anterior talofibular ligament of left ankle;  Left ankle pain; abnormalities of gait and mobility      [x]  Progress Note                []  Discharge Note    Evaluation Date:  22  Total Visits to date:  11  Cancels/No-shows to date:      Subjective:    Baldemar Miller states that he has seen improvement with physical therapy. He reports that his strength is improving, however not near where it needs to be. He is able to work without a brace and walking has improved. However, he still experiences fatigue and discomfort at work and with walking. He experiences increased pain at the end of the workday. He is still unable to run and hop, which prevents him from working out and boxing.     Plan of Care/Treatment to date:  [x] Therapeutic Exercise    [x] Modalities:  [x] Therapeutic Activity     [] Ultrasound  [] Electrical Stimulation  [x] Gait Training      [] Cervical Traction   [] Lumbar Traction  [x] Neuromuscular Re-education  [x] Cold/hotpack [] Iontophoresis  [x] Instruction in HEP      Other:  [x] Manual Therapy       [x]  Vasopneumatic  [] Aquatic Therapy       []   Dry Needle Therapy                      Objective/Significant Findings At Last Visit/Comments:    LLE AROM : Exceptions  L Ankle Dorsiflexion 0-20: 12  L Ankle Plantar Flexion 0-45: 50  L Ankle Forefoot Inversion 0-40:  30  L Ankle Forefoot Eversion 0-20: 15     Strength LLE  Strength LLE: Exception  L Ankle Dorsiflexion: 5/5  L Ankle Plantar Flexion: 4+/5  L Ankle Inversion: 5/5  L Ankle Eversion: 5/5     SLS: 4 seconds, instability at the ankle     Able to perform DL heel raise        Assessment:     Progress Note  Rosalio Hoang continues to work towards his goals in physical therapy. Patient has met most of his short term goals at this time and will now focus on long term goals. Patient has made good objective findings including improved L ankle AROM, improved L ankle strength and improved gait. However, patient continues to present with decreased L ankle stability with SLS. Patient also continues to experience increased fatigue in the L ankle and increased pain towards the end of the day. Patient will continue to benefit from skilled therapeutic intervention in this setting to decrease pain and improve overall functional mobility. Goal Status:  [] Achieved [x] Partially Achieved  [] Not Achieved     Changes to goals:    Patient goals : Improve strength, improve walking, return to working out  Short term goals  Time Frame for Short term goals: 4 weeks  Short term goal 1: Patient will report compliance with HEP. Met  Short term goal 2: Patient will present with improved L ankle AROM to 30 degrees plantarflexion, 12 degrees dorsiflexion, 25 degrees inversion, and 20 degrees eversion. Met  Short term goal 3: Patient will report a decrease in L ankle pain at the end of the work day from 7/10 to 3/10. Ongoing  5/10  Short term goal 4: Patient will present with improved gait pattern with improved toe off. Ongoing- improving        Long term goals  Time Frame for Long term goals : 10 weeks  Long term goal 1: Patient will present with improved L ankle AROM to WNL. Long term goal 2: Patient will present with improved L ankle strength to 5/5 globally. Long term goal 3: Patient will present with normalized gait pattern and ability to perform a SLS for > 20 seconds. Long term goal 4: Patient will report an improved ability to complete a 12 hour shift without pain.        Frequency/Duration:  # Days per week: [] 1 day # Weeks: [] 1 week [] 4 weeks [] 8 weeks     [x] 2 days   [] 2 weeks [] 5 weeks [] 10 weeks     [] 3 days   [] 3 weeks [x] 6 weeks [] 12 weeks       Rehab Potential: [] Excellent [x] Good [] Fair  [] Poor         Patient Status: [] Continue per initial plan of Care     [] Patient now discharged     [x] Additional visits requested, Please re-certify for additional visits:      Requested frequency/duration:  2xweek for 6 weeks    If we are requesting more visits, we fully anticipate the patient's condition is expected to improve within the treatment timeframe we are requesting. Electronically signed by:  Tonie Amezquita, PT    3/3/2022, 3:23 PM    If you have any questions or concerns, please don't hesitate to call.   Thank you for your referral.    Physician Signature:______________________ Date:______ Time: ________  By signing above, therapists plan is approved by physician

## 2022-03-09 ENCOUNTER — OFFICE VISIT (OUTPATIENT)
Dept: ORTHOPEDIC SURGERY | Age: 42
End: 2022-03-09
Payer: COMMERCIAL

## 2022-03-09 VITALS
HEIGHT: 72 IN | OXYGEN SATURATION: 95 % | RESPIRATION RATE: 15 BRPM | BODY MASS INDEX: 40.63 KG/M2 | HEART RATE: 74 BPM | WEIGHT: 300 LBS

## 2022-03-09 DIAGNOSIS — S93.402A SPRAIN OF LEFT ANKLE, UNSPECIFIED LIGAMENT, INITIAL ENCOUNTER: Primary | ICD-10-CM

## 2022-03-09 PROCEDURE — G8427 DOCREV CUR MEDS BY ELIG CLIN: HCPCS | Performed by: STUDENT IN AN ORGANIZED HEALTH CARE EDUCATION/TRAINING PROGRAM

## 2022-03-09 PROCEDURE — 99213 OFFICE O/P EST LOW 20 MIN: CPT | Performed by: STUDENT IN AN ORGANIZED HEALTH CARE EDUCATION/TRAINING PROGRAM

## 2022-03-09 PROCEDURE — 4004F PT TOBACCO SCREEN RCVD TLK: CPT | Performed by: STUDENT IN AN ORGANIZED HEALTH CARE EDUCATION/TRAINING PROGRAM

## 2022-03-09 PROCEDURE — G8417 CALC BMI ABV UP PARAM F/U: HCPCS | Performed by: STUDENT IN AN ORGANIZED HEALTH CARE EDUCATION/TRAINING PROGRAM

## 2022-03-09 PROCEDURE — G8484 FLU IMMUNIZE NO ADMIN: HCPCS | Performed by: STUDENT IN AN ORGANIZED HEALTH CARE EDUCATION/TRAINING PROGRAM

## 2022-03-09 ASSESSMENT — ENCOUNTER SYMPTOMS
COUGH: 0
DIARRHEA: 0
BACK PAIN: 0
WHEEZING: 0
VOMITING: 0
NAUSEA: 0
SHORTNESS OF BREATH: 0
COLOR CHANGE: 0
SORE THROAT: 0

## 2022-03-09 NOTE — PROGRESS NOTES
3/9/2022   Chief Complaint   Patient presents with    Ankle Injury     left      Updated HPI: Patient is here for reevaluation of his left ankle. Patient states he is completed physical therapy states he is greatly improved since last being seen. He transition to lace up ankle brace without issue and uses it during work for any kind of exercise activity. He states his only complaint is that when he tries to do high impact or explosive exercises, he does feel weak in the ankle and has issues competing. Otherwise, he states he is doing great and has no additional complaints. Previous HPI (1/5/2022): Patient is here for reevaluation of his left ankle. Patient states he has significantly improved and he continues to use the walking boot. He states he has been been inconsistent with his home exercise program.  He states he does have somewhat of a limp when attempting to do heel-to-toe walking. He has no new complaints this time or any new injuries. Previous HPI (12-8-2021): Patient is here for reevaluation of his left ankle. Patient states he has been compliant in his walking boot and states he has significant improved since last being seen. He also states he has been vigorous with his home exercise program.  He notices that his swelling has significantly improved and he is able to ambulate out of the boot with minimal pain. He has no new injury or complaints last being seen. Previous HPI (11-3-2021):                           Beckie Beal is a 35 y.o. male works as a ,  referred by emergency room for evaluation and treatment of left ankle pain. 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 He had immediate pain and inability ambulate.   He was taken to the emergency room with x-rays were performed and were negative for any type of fracture but he was placed in an Aircast and given crutches to limit his weightbearing. He was also to follow-up with orthopedics and is here today for his first visit with myself. He denies any prior left ankle injury or pain. He does however have a history of significant flatfoot bilaterally that does cause occasional foot issues. The pain's location is left lateral ankle ligament. he describes the symptoms as aching, sharp and stabbing. Symptoms improve with rest. Symptoms worsen with ankle plantarflexion/dorsiflexion, weight bearing (especially stair climbing), twisting activities. Patient states he denies having sensation of instability, decreased ROM    Treatment to date has been ice, NSAID, bracing without significant relief. Patient denies prior injury to ankle, denies numbness, tingling, fever, chills. Is affecting ADLs. Pain is 8/10 at it's worst.    Outside reports reviewed: Emergency room note and imaging reviewed    Patient's medications, allergies, past medical, surgical, social and family histories were reviewed and updated as appropriate. MA HPI: Patient is a 41year old male. Patient is in the office today with left ankle injury. 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 Patient states that the injury happened 10/27/21. He is currently wearing an aircast and ambulating with crutches. Pain scale  8/10. His pain started out on the lateral side of his ankle and has progressed to the posterior. He complains of aching pain at night. He denies any previous injuries, surgeries or injections to his left ankle. Occupation: [employer] History  Patient's medications, allergies, past medical, surgical, social and family histories were reviewed and updated as appropriate. No past medical history on file. No past surgical history on file.   Family History   Problem Relation Age of Onset    Depression Mother     Diabetes Father     High Blood Pressure Father     Heart Disease Father     Glaucoma Father     Asthma Brother      Social History     Socioeconomic History    Marital status: Single     Spouse name: Not on file    Number of children: Not on file    Years of education: Not on file    Highest education level: Not on file   Occupational History    Not on file   Tobacco Use    Smoking status: Current Every Day Smoker     Packs/day: 0.50     Types: Cigarettes    Smokeless tobacco: Never Used   Substance and Sexual Activity    Alcohol use: Yes    Drug use: Not Currently     Types: Marijuana [place])    Sexual activity: Yes     Partners: Female   Other Topics Concern    Not on file   Social History Narrative    Not on file     Social Determinants of Health     Financial Resource Strain:     Difficulty of Paying Living Expenses: Not on file   Food Insecurity:     Worried About Running Out of Food in the Last Year: Not on file    Esteban of Food in the Last Year: Not on file   Transportation Needs:     Lack of Transportation (Medical): Not on file    Lack of Transportation (Non-Medical):  Not on file   Physical Activity:     Days of Exercise per Week: Not on file    Minutes of Exercise per Session: Not on file   Stress:     Feeling of Stress : Not on file   Social Connections:     Frequency of Communication with Friends and Family: Not on file    Frequency of Social Gatherings with Friends and Family: Not on file    Attends Alevism Services: Not on file    Active Member of Clubs or Organizations: Not on file    Attends Club or Organization Meetings: Not on file    Marital Status: Not on file   Intimate Partner Violence:     Fear of Current or Ex-Partner: Not on file    Emotionally Abused: Not on file    Physically Abused: Not on file    Sexually Abused: Not on file   Housing Stability:     Unable to Pay for Housing in the Last Year: Not on file    Number of Places Lived in the Last Year: Not on file    Unstable Housing in the Last Year: Not on file     Current Outpatient Medications   Medication Sig Dispense Refill    ibuprofen (ADVIL;MOTRIN) 800 MG tablet Take 1 tablet by mouth 3 times daily (with meals) 90 tablet 0     No current facility-administered medications for this visit. No Known Allergies      Review of Systems   Constitutional: Negative for activity change, fatigue and fever. HENT: Negative for hearing loss, sneezing and sore throat. Respiratory: Negative for cough, shortness of breath and wheezing. Cardiovascular: Negative for chest pain, palpitations and leg swelling. Gastrointestinal: Negative for diarrhea, nausea and vomiting. Musculoskeletal: Positive for arthralgias and gait problem. Negative for back pain, joint swelling and myalgias. Skin: Negative for color change, pallor, rash and wound. Neurological: Negative for weakness, numbness and headaches. Psychiatric/Behavioral: Negative for agitation and confusion. The patient is not hyperactive. Examination:  General Exam:  Vitals: There were no vitals taken for this visit. Physical Exam  Constitutional:       General: He is not in acute distress. Appearance: Normal appearance. He is not ill-appearing. Eyes:      General:         Right eye: No discharge. Left eye: No discharge. Extraocular Movements: Extraocular movements intact. Cardiovascular:      Rate and Rhythm: Normal rate. Pulmonary:      Effort: Pulmonary effort is normal. No respiratory distress. Breath sounds: No wheezing. Musculoskeletal:         General: Tenderness present. No swelling, deformity or signs of injury. Right knee: Normal.      Left knee: Normal.      Right lower leg: Normal. No edema. Left lower leg: Normal. No edema. Right ankle: Normal.      Right Achilles Tendon: Normal.      Left ankle: Swelling present. No deformity, ecchymosis or lacerations.  Tenderness present over the ATF ligament. No lateral malleolus, medial malleolus, CF ligament or posterior TF ligament tenderness. Normal range of motion. Anterior drawer test negative. Normal pulse. Left Achilles Tendon: Normal.      Right foot: Normal.      Left foot: Normal.   Skin:     General: Skin is warm and dry. Capillary Refill: Capillary refill takes less than 2 seconds. Neurological:      General: No focal deficit present. Mental Status: He is alert and oriented to person, place, and time. Sensory: No sensory deficit. Coordination: Coordination normal.      Gait: Gait normal.   Psychiatric:         Mood and Affect: Mood normal.         Behavior: Behavior normal.        LEFT Foot and Ankle Exam      INSPECTION: ALIGNMENT:    Gait: Nonantalgic    Alignment:     Hindfoot: Valgus deformity     Midfoot: Flat foot     Forefoot: Normal    Scars: None   Color: Normal     Swelling: None    Atrophy: None Collective     Heel / Toe Walking: No pain or limp    Ankle-Hindfoot Alignment:    Good -plantigrade (PG), well aligned      TENDERNESS:    Sinus tarsi: None   Anteromedial joint line: none    Syndesmosis: none   Anterolateral joint line: none    ATFL: None   talonavicular: none     CFL: None   anterior tibialis: none    Anterolateral gutter: none  Extensor tendons: none    Fibula: none    Peroneal tendons: none     Peroneal tubercle.  None     Medial/lateral achilles: none    Medial/lateral achilles insertion: none      Deltoid: none        Malleolus: none   Retrocalcaneal: none    PTT: none    Medial achilles: none    Navicular: none   Lateral achilles: none    Calcaneal tuberosity: none        Calcaneal cuboid: none  MT / MT heads: none     Navicular: none   Medial cord origin PF: none    Cuneiforms: none   Web space: none    Lisfranc none    Tarsal tunnel: none    Base of the fifth metatarsal: none     Tinels sign: neg        RANGE OF MOTION:  RIGHT   LEFT    STRENGTH: (Left) (* = pain) Ankle DF/PF:    15/45    15/45    Anterior tibialis: 5/5    Eversion/Inversion:   15/25    15/25   Posterior tibialis: 5/5    Midfoot ABD/ADD:   10/10    10/10   Gastroc-soleus: 5/5    First MTP DF/PF:   60/25 60/25   Peroneals: 5/5     EHL: 5/5             FHL: 5/5        SPECIAL TESTS:    Anterior drawer:  Grade 1      (C-W contralateral side)     Inversion: 30°     Eversion 10°      Collective Instability: (Ant-post and varus-valgus)    Stable      PROVOCATIVE TESTING:    Forced DF/ER: No pain at syndesmosis. Mid-leg squeeze No pain at syndesmosis    Forced DF: No pain anterior joint line. Forced PF: No pain posterior ankle. Forced INV: No pain present laterally    Forced EV: No pain medial     Leys sign: Normal ankle plantar flexion. Resisted peroneal No subluxation or pain    1st-2nd MT toggle No pain at Lisfranc    MT-T torque No pain at Lisfranc      NEUROLOGIC TESTING:    All dermatomes foot, ankle and leg have normal sensation light touch    Ankle Reflexes 2+, symmetric     Negative Babinski and No Clonus      VASCULAR:  2+ pulses PT/DT with brisk capillary refill toes. Diagnostic testing:  No new imaging    Previous imaging:  X-ray images were reviewed by myself and discussed with the patient:  3 views of left ankle in a skeletally mature patient shows no acute osseous abnormalities. Patient does have mild generative changes at the left ankle joint and significant flatfoot deformity. He also has several osteophytes including at the calcaneus. Ankle mortise is well-positioned with symmetric joint space throughout. Appropriate tib-fib overlap and clear space seen. Office Procedures:  No orders of the defined types were placed in this encounter. Assessment and Plan    A: Left ankle sprain    P:   I had a thorough conversation with the patient regarding his ankle pain and treatment course.   I explained to the patient that he is greatly improved his strength and ambulation. He has no concerning findings on examination and we just need to continue working on strength and proprioception. I explained I am very happy to provide him with more physical therapy as I feel that this will only make him better. He should continue using the lace up ankle brace if working or doing explosive exercising. He should continue using ice and over-the-counter anti-inflammatories for pain control. He will follow-up as needed. All questions answered and patient voiced understanding.       Electronically signed by Odell Ahumada, DO on 3/9/2022 at 1:10 PM

## 2022-03-09 NOTE — PROGRESS NOTES
Pt comes in today for a follow up of a left ankle injury that happened in October of 2021. He was last seen in our office on 1/5/22. He rates his pain at 0/10 currently. He has completed physical therapy and states that it helped but he would like more PT. He still has issues with stiffness and muscle fatigue. He also states that he can not jog/run. He denies any new falls or injuries.    Occupation:

## 2022-03-09 NOTE — PATIENT INSTRUCTIONS
PT ordered  Work on ROM and strengthening exercises per PT protocol  Rest, ice, and elevate  Weightbearing as tolerated

## 2022-06-06 ENCOUNTER — HOSPITAL ENCOUNTER (EMERGENCY)
Age: 42
Discharge: HOME | End: 2022-06-06
Attending: STUDENT IN AN ORGANIZED HEALTH CARE EDUCATION/TRAINING PROGRAM
Payer: COMMERCIAL

## 2022-06-06 ENCOUNTER — APPOINTMENT (OUTPATIENT)
Dept: GENERAL RADIOLOGY | Age: 42
End: 2022-06-06
Payer: COMMERCIAL

## 2022-06-06 VITALS
DIASTOLIC BLOOD PRESSURE: 126 MMHG | RESPIRATION RATE: 15 BRPM | TEMPERATURE: 98 F | SYSTOLIC BLOOD PRESSURE: 181 MMHG | BODY MASS INDEX: 42.66 KG/M2 | HEIGHT: 72 IN | WEIGHT: 315 LBS | HEART RATE: 68 BPM | OXYGEN SATURATION: 95 %

## 2022-06-06 DIAGNOSIS — M79.674 PAIN OF TOE OF RIGHT FOOT: Primary | ICD-10-CM

## 2022-06-06 PROCEDURE — 73630 X-RAY EXAM OF FOOT: CPT

## 2022-06-06 PROCEDURE — 99283 EMERGENCY DEPT VISIT LOW MDM: CPT

## 2022-06-06 RX ORDER — IBUPROFEN 600 MG/1
600 TABLET ORAL 3 TIMES DAILY PRN
Qty: 30 TABLET | Refills: 0 | Status: SHIPPED | OUTPATIENT
Start: 2022-06-06 | End: 2022-10-25

## 2022-06-06 RX ORDER — METHYLPREDNISOLONE 4 MG/1
TABLET ORAL
Qty: 1 KIT | Refills: 0 | Status: SHIPPED | OUTPATIENT
Start: 2022-06-06 | End: 2022-06-06 | Stop reason: SDUPTHER

## 2022-06-06 RX ORDER — IBUPROFEN 600 MG/1
600 TABLET ORAL 3 TIMES DAILY PRN
Qty: 30 TABLET | Refills: 0 | Status: SHIPPED | OUTPATIENT
Start: 2022-06-06 | End: 2022-06-06 | Stop reason: SDUPTHER

## 2022-06-06 RX ORDER — METHYLPREDNISOLONE 4 MG/1
TABLET ORAL
Qty: 1 KIT | Refills: 0 | Status: SHIPPED | OUTPATIENT
Start: 2022-06-06 | End: 2022-10-25

## 2022-06-06 ASSESSMENT — PAIN - FUNCTIONAL ASSESSMENT: PAIN_FUNCTIONAL_ASSESSMENT: NONE - DENIES PAIN

## 2022-06-06 NOTE — ED PROVIDER NOTES
[id number]  [facility]      Pt Name: Kaykay You  MRN: [id number]  [place] 1980  Date of evaluation: 6/6/2022  Provider: Tori Rodriguez MD    CHIEF COMPLAINT       Chief Complaint   Patient presents with    Toe Pain       HISTORY OF [address] Jimenez is a 35 y.o. male Presenting with 3 weeks of right great toe pain. Endorsing swelling, erythema, tenderness to palpation. Has been difficult to walk on the foot. Has been getting worse over the past few days. Does not have a primary care doctor. Does not have a history of gout. Does experience occasional joint pains. Denies any fevers, chills, nausea, vomiting, rash. Nursing Notes were reviewed. REVIEW OF SYSTEMS     Review of Systems  A 10 point review of system was performed and is otherwise negative apart from what is noted in HPI and nursing notes. As is my standard practice, all pertinent positives from the ROS are documented in the HPI. PAST MEDICAL HISTORY   History reviewed. No pertinent past medical history. SURGICAL HISTORY     History reviewed. No pertinent surgical history. CURRENT MEDICATIONS       Current Discharge Medication List      CONTINUE these medications which have NOT CHANGED    Details   !! ibuprofen (ADVIL;MOTRIN) 800 MG tablet Take 1 tablet by mouth 3 times daily (with meals)  Qty: 90 tablet, Refills: 0    Associated Diagnoses: Sprain of anterior talofibular ligament of left ankle, initial encounter       !! - Potential duplicate medications found. Please discuss with provider. ALLERGIES     Patient has no known allergies.     FAMILY HISTORY       Family History   Problem Relation Age of Onset    Depression Mother     Diabetes Father     High Blood Pressure Father     Heart Disease Father     Glaucoma Father     Asthma Brother         SOCIAL HISTORY       Social History     Socioeconomic History    Marital status: Single Spouse name: None    Number of children: None    Years of education: None    Highest education level: None   Occupational History    None   Tobacco Use    Smoking status: Current Every Day Smoker     Packs/day: 0.50     Types: Cigarettes    Smokeless tobacco: Never Used   Substance and Sexual Activity    Alcohol use: Yes    Drug use: Not Currently     Types: Marijuana Meghann Ing)    Sexual activity: Yes     Partners: Female   Other Topics Concern    None   Social History Narrative    None     Social Determinants of Health     Financial Resource Strain:     Difficulty of Paying Living Expenses: Not on file   Food Insecurity:     Worried About Running Out of Food in the Last Year: Not on file    Esteban of Food in the Last Year: Not on file   Transportation Needs:     Lack of Transportation (Medical): Not on file    Lack of Transportation (Non-Medical):  Not on file   Physical Activity:     Days of Exercise per Week: Not on file    Minutes of Exercise per Session: Not on file   Stress:     Feeling of Stress : Not on file   Social Connections:     Frequency of Communication with Friends and Family: Not on file    Frequency of Social Gatherings with Friends and Family: Not on file    Attends Mosque Services: Not on file    Active Member of [address] [place] [address] or Organizations: Not on file    Attends Club or Organization Meetings: Not on file    Marital Status: Not on file   Intimate Partner Violence:     Fear of Current or Ex-Partner: Not on file    Emotionally Abused: Not on file    Physically Abused: Not on file    Sexually Abused: Not on file   Housing Stability:     Unable to Pay for Housing in the Last Year: Not on file    Number of [place] in the Last Year: Not on file    Unstable Housing in the Last Year: Not on file       PHYSICAL EXAM       ED Triage Vitals [06/06/22 1608]   BP Temp Temp src Heart Rate Resp SpO2 Height Weight   (!) 181/126 98 °F (36.7 °C) -- 68 15 95 % 6' (1.829 m) (!) 330 lb (149.7 kg)         Physical Exam  Vitals and nursing note reviewed. Constitutional:       Appearance: He is not toxic-appearing. HENT:      Head: Normocephalic. Eyes:      Extraocular Movements: Extraocular movements intact. Conjunctiva/sclera: Conjunctivae normal.      Pupils: Pupils are equal, round, and reactive to light. Cardiovascular:      Rate and Rhythm: Normal rate. Comments: Normal peripheral perfusion  Pulmonary:      Effort: Pulmonary effort is normal. No respiratory distress. Abdominal:      General: Abdomen is flat. Musculoskeletal:      Cervical back: Normal range of motion. Comments: Erythema, swelling, tenderness palpation to the right great toe proximal joint, full range of motion. No loss of sensation. Skin:     General: Skin is warm and dry. Neurological:      General: No focal deficit present. Mental Status: He is alert. Psychiatric:         Mood and Affect: Mood normal.         Behavior: Behavior normal.         DIAGNOSTIC RESULTS     EKG: All EKG's are interpreted by me in the absence of a cardiologist.      RADIOLOGY:   Interpretation per the Radiologist below, if available at the time of this note:    XR FOOT RIGHT (MIN 3 VIEWS)   Final Result   1. No acute osseous abnormality. 2. Mild degenerative change of the 1st MTP joint. 3. Moderate hindfoot and midfoot degenerative change. LABS:  Labs Reviewed - No data to display    All other labs were within normal range or not returned as of this dictation. EMERGENCY DEPARTMENT COURSE        DIFFERENTIAL DIAGNOSIS/MDM:   Vitals:    Vitals:    06/06/22 1608   BP: (!) 181/126   Pulse: 68   Resp: 15   Temp: 98 °F (36.7 °C)   SpO2: 95%   Weight: (!) 330 lb (149.7 kg)   Height: 6' (1.829 m)       MDM  Number of Diagnoses or Management Options  Pain of toe of right foot  Diagnosis management comments: 42-year-old male presenting for right toe swelling and pain. No history of gout.   Does have history of occasional foot and ankle joint pain. No fevers, chills, nausea, vomiting to suggest septic joint or infection such as cellulitis. Physical exam is consistent with gout. X-ray shows osteoarthritis throughout foot. Possible patient is experiencing symptoms from both. Instructed to follow-up with his primary care doctor. Given ibuprofen and steroids for his symptoms. CONSULTS:  None    PROCEDURES:  Unless otherwise noted below, none. Procedures      FINAL IMPRESSION      1. Pain of toe of right foot          PATIENT REFERRED TO:  No follow-up provider specified. DISCHARGE MEDICATIONS:  Current Discharge Medication List      START taking these medications    Details   !! ibuprofen (ADVIL;MOTRIN) 600 MG tablet Take 1 tablet by mouth 3 times daily as needed for Pain  Qty: 30 tablet, Refills: 0      methylPREDNISolone (MEDROL DOSEPACK) 4 MG tablet Take by mouth. Qty: 1 kit, Refills: 0       !! - Potential duplicate medications found. Please discuss with provider. Controlled Substances Monitoring:     No flowsheet data found.     (Please note that portions of this note were completed with a voice recognition program.  Efforts were made to edit the dictations but occasionally words are mis-transcribed.)    Lauryn Best MD (electronically signed)  Attending Emergency Physician            Lauryn Best MD  06/06/22 0973

## 2022-06-06 NOTE — ED TRIAGE NOTES
Pt arrived via triage with c/o right great toe pain x3 weeks. Pt reports he believes it is gout. Pt reports no injury. Pt is alert, oriented and ambulatory. Pt reports taking \"cherry tart\" as natural remedy.

## 2022-07-05 ENCOUNTER — NURSE TRIAGE (OUTPATIENT)
Dept: OTHER | Facility: CLINIC | Age: 42
End: 2022-07-05

## 2022-07-05 NOTE — TELEPHONE ENCOUNTER
Received call from Olayinka Storm at [facility] with [employer] Complaint. Subjective: Caller states \"Gout\"     Current Symptoms: Gout in right great toe with redness and pain;  High blood pressure, reading this morning is 181/126;  Denies chest pain, SOB, N/V/D;  Occasional headache; Onset: Gout several months; blood pressure is unsure of how long it has been high;     Associated Symptoms: NA    Pain Severity: 8/10; Has to walk on heal to decrease pain;    Temperature: denies     What has been tried: Ibuprofen but without relief;    Recommended disposition: Go to [facility]    Care advice provided, patient verbalizes understanding; denies any other questions or concerns; instructed to call back for any new or worsening symptoms. Patient/Caller agrees with recommended disposition; writer provided warm transfer to Aleida Dunne at [facility] for appointment scheduling     Attention Provider: Thank you for allowing me to participate in the care of your patient. The patient was connected to triage in response to information provided to the ECC/PSC. Please do not respond through this encounter as the response is not directed to a shared pool.     Reason for Disposition   Systolic BP >= 571 OR Diastolic >= 363 and having NO cardiac or neurologic symptoms    Protocols used: BLOOD PRESSURE - HIGH-ADULT-[place]

## 2022-07-06 ENCOUNTER — OFFICE VISIT (OUTPATIENT)
Dept: FAMILY MEDICINE CLINIC | Age: 42
End: 2022-07-06
Payer: COMMERCIAL

## 2022-07-06 VITALS
HEART RATE: 68 BPM | BODY MASS INDEX: 42.66 KG/M2 | WEIGHT: 315 LBS | OXYGEN SATURATION: 97 % | TEMPERATURE: 97.5 F | HEIGHT: 72 IN | SYSTOLIC BLOOD PRESSURE: 132 MMHG | DIASTOLIC BLOOD PRESSURE: 82 MMHG

## 2022-07-06 DIAGNOSIS — R03.0 ELEVATED BLOOD PRESSURE READING: Primary | ICD-10-CM

## 2022-07-06 DIAGNOSIS — S93.492A SPRAIN OF ANTERIOR TALOFIBULAR LIGAMENT OF LEFT ANKLE, INITIAL ENCOUNTER: ICD-10-CM

## 2022-07-06 PROCEDURE — 99203 OFFICE O/P NEW LOW 30 MIN: CPT | Performed by: NURSE PRACTITIONER

## 2022-07-06 PROCEDURE — 4004F PT TOBACCO SCREEN RCVD TLK: CPT | Performed by: NURSE PRACTITIONER

## 2022-07-06 PROCEDURE — G8417 CALC BMI ABV UP PARAM F/U: HCPCS | Performed by: NURSE PRACTITIONER

## 2022-07-06 PROCEDURE — G8427 DOCREV CUR MEDS BY ELIG CLIN: HCPCS | Performed by: NURSE PRACTITIONER

## 2022-07-06 ASSESSMENT — ENCOUNTER SYMPTOMS
RHINORRHEA: 0
DIARRHEA: 0
CHEST TIGHTNESS: 0
SINUS PAIN: 0
COUGH: 0
WHEEZING: 0
SHORTNESS OF BREATH: 0
SINUS PRESSURE: 0
NAUSEA: 0
SORE THROAT: 0
VOMITING: 0

## 2022-07-06 NOTE — PROGRESS NOTES
Manuel Mi   35 y.o.  male  [id number]      Chief Complaint   Patient presents with    Other     Patient stated that his BP in the hosptal was 180/126. Todays BP was 110/78. Subjective:  35 y.o.male is here for a follow up. He has the following chronic/acute medical problems:  Patient Active Problem List   Diagnosis    Absolute anemia    Tobacco use disorder    Sprain of anterior talofibular ligament of left ankle       Patient states he came in for appointment today because he was told in the ER on 6/6/2022 he had high blood pressure. He states he was told to follow up with his PCP. Patient states he has not seen his PCP in three years so he has to start over as a new patient. Patient states he went to the ER on 6/6/2022 because he had gout in his right great toe. Patient states he was given a medrol dose pack and ibuprofen - states the medication cleared it up. Review of Systems   Constitutional: Negative for appetite change, chills, fatigue and fever. HENT: Negative for congestion, ear pain, postnasal drip, rhinorrhea, sinus pressure, sinus pain, sneezing and sore throat. Respiratory: Negative for cough, chest tightness, shortness of breath and wheezing. Cardiovascular: Negative for chest pain and palpitations. Gastrointestinal: Negative for diarrhea, nausea and vomiting. Skin: Negative for rash. Neurological: Negative for dizziness, light-headedness and headaches. Current Outpatient Medications   Medication Sig Dispense Refill    Blood Pressure Monitoring (BLOOD PRESSURE MONITOR/WRIST) AP Check BID 1 each 0    ibuprofen (ADVIL;MOTRIN) 600 MG tablet Take 1 tablet by mouth 3 times daily as needed for Pain 30 tablet 0    methylPREDNISolone (MEDROL DOSEPACK) 4 MG tablet Take by mouth.  1 kit 0    ibuprofen (ADVIL;MOTRIN) 800 MG tablet Take 1 tablet by mouth 3 times daily (with meals) (Patient not taking: Reported on 7/6/2022) 90 tablet 0     No current facility-administered medications for this visit. Past medical, family,surgical history reviewed today. Objective:  /82 (Site: Right Upper Arm, Position: Sitting)   Pulse 68   Temp 97.5 °F (36.4 °C) (Temporal)   Ht 6' (1.829 m)   Wt (!) 319 lb (144.7 kg)   SpO2 97%   BMI 43.26 kg/m²   BP Readings from Last 3 Encounters:   07/06/22 132/82   06/06/22 (!) 181/126   10/31/21 (!) 174/100     Wt Readings from Last 3 Encounters:   07/06/22 (!) 319 lb (144.7 kg)   06/06/22 (!) 330 lb (149.7 kg)   03/09/22 300 lb (136.1 kg)         Physical Exam  Constitutional:       Appearance: Normal appearance. HENT:      Head: Normocephalic. Cardiovascular:      Rate and Rhythm: Normal rate and regular rhythm. Heart sounds: Normal heart sounds. Pulmonary:      Effort: Pulmonary effort is normal.      Breath sounds: Normal breath sounds. Musculoskeletal:      Cervical back: Neck supple. Skin:     General: Skin is warm and dry. Neurological:      Mental Status: He is alert and oriented to person, place, and time.    Psychiatric:         Mood and Affect: Mood normal.         Behavior: Behavior normal.         Lab Results   Component Value Date    WBC 6.7 01/29/2019    HGB 12.2 (L) 01/29/2019    HCT 40.4 (L) 01/29/2019    MCV 69.1 (L) 01/29/2019     01/29/2019     Lab Results   Component Value Date     01/29/2019    K 3.9 01/29/2019     01/29/2019    CO2 27 01/29/2019    BUN 13 01/29/2019    CREATININE 1.0 01/29/2019    GLUCOSE 103 (H) 01/29/2019    CALCIUM 9.4 01/29/2019    PROT 8.0 01/29/2019    LABALBU 4.4 01/29/2019    BILITOT 0.5 01/29/2019    ALKPHOS 62 01/29/2019    AST 24 01/29/2019    ALT 32 01/29/2019    LABGLOM >60 01/29/2019    GFRAA >60 01/29/2019     Lab Results   Component Value Date    CHOL 195 03/12/2019     Lab Results   Component Value Date    TRIG 61 03/12/2019     Lab Results   Component Value Date    HDL 91 (H) 03/12/2019     Lab Results   Component Value Date [address] 92 03/12/2019     Lab Results   Component Value Date    LABA1C 5.4 03/12/2019     Lab Results   Component Value Date    TSH 1.05 03/12/2019         ASSESSMENT/PLAN:      1. Elevated blood pressure reading  According to records patient blood pressure in ER on 6/6/2022 was 181/126. Blood pressure in office today 110/78, 118/78, and 132/82. Discussed with patient wound send order for him to get a blood pressure cuff to pharmacy. Advised to check BID - morning and night and keep a log. Advised to follow up if blood pressure consistently above 140/80. Patient has appointment to establish with PCP on 8/10/2022. - Blood Pressure Monitoring (BLOOD PRESSURE MONITOR/WRIST) AP; Check BID  Dispense: 1 each; Refill: 0              No orders of the defined types were placed in this encounter. Care discussed with patient. Questions answered. Patient verbalizes understanding and agrees with plan. After visit summary provided. Advised to call for any problems, questions, or concerns. Return if symptoms worsen or fail to improve.                                            Signed:  SASHA Jimenez CNP  07/06/22  10:06 AM

## 2022-07-07 RX ORDER — IBUPROFEN 800 MG/1
800 TABLET ORAL
Qty: 90 TABLET | Refills: 0 | Status: SHIPPED | OUTPATIENT
Start: 2022-07-07 | End: 2022-10-25

## 2022-09-28 ENCOUNTER — OFFICE VISIT (OUTPATIENT)
Dept: ORTHOPEDIC SURGERY | Age: 42
End: 2022-09-28
Payer: COMMERCIAL

## 2022-09-28 VITALS — HEART RATE: 60 BPM | DIASTOLIC BLOOD PRESSURE: 104 MMHG | SYSTOLIC BLOOD PRESSURE: 148 MMHG | OXYGEN SATURATION: 98 %

## 2022-09-28 DIAGNOSIS — M19.072 ARTHRITIS OF FOOT, LEFT: ICD-10-CM

## 2022-09-28 DIAGNOSIS — S93.492S SPRAIN OF ANTERIOR TALOFIBULAR LIGAMENT OF LEFT ANKLE, SEQUELA: Primary | ICD-10-CM

## 2022-09-28 PROCEDURE — G8427 DOCREV CUR MEDS BY ELIG CLIN: HCPCS | Performed by: STUDENT IN AN ORGANIZED HEALTH CARE EDUCATION/TRAINING PROGRAM

## 2022-09-28 PROCEDURE — 99213 OFFICE O/P EST LOW 20 MIN: CPT | Performed by: STUDENT IN AN ORGANIZED HEALTH CARE EDUCATION/TRAINING PROGRAM

## 2022-09-28 PROCEDURE — G8417 CALC BMI ABV UP PARAM F/U: HCPCS | Performed by: STUDENT IN AN ORGANIZED HEALTH CARE EDUCATION/TRAINING PROGRAM

## 2022-09-28 PROCEDURE — 4004F PT TOBACCO SCREEN RCVD TLK: CPT | Performed by: STUDENT IN AN ORGANIZED HEALTH CARE EDUCATION/TRAINING PROGRAM

## 2022-09-28 ASSESSMENT — ENCOUNTER SYMPTOMS
COLOR CHANGE: 0
SHORTNESS OF BREATH: 0
WHEEZING: 0
BACK PAIN: 0
SORE THROAT: 0
NAUSEA: 0
DIARRHEA: 0
COUGH: 0
VOMITING: 0

## 2022-09-28 NOTE — PROGRESS NOTES
9/28/2022   Chief Complaint   Patient presents with    Joint Swelling      Updated HPI: Patient is here for reevaluation of his left ankle. Patient states that the previous ankle injury resolved with our conservative treatment but he has significant swelling and pain in his foot. He does use over-the-counter insoles but states that he he has more frequent days where he comes home from work with significant lower extremity swelling and increasing pain. He denies any previous injury to the foot. He has no other complaints at this time. He has had no treatment thus far to the foot. No changes in his health history. Previous HPI (3/9/2022): Patient is here for reevaluation of his left ankle. Patient states he is completed physical therapy states he is greatly improved since last being seen. He transition to lace up ankle brace without issue and uses it during work for any kind of exercise activity. He states his only complaint is that when he tries to do high impact or explosive exercises, he does feel weak in the ankle and has issues competing. Otherwise, he states he is doing great and has no additional complaints. Previous HPI (1/5/2022): Patient is here for reevaluation of his left ankle. Patient states he has significantly improved and he continues to use the walking boot. He states he has been been inconsistent with his home exercise program.  He states he does have somewhat of a limp when attempting to do heel-to-toe walking. He has no new complaints this time or any new injuries. Previous HPI (12-8-2021): Patient is here for reevaluation of his left ankle. Patient states he has been compliant in his walking boot and states he has significant improved since last being seen. He also states he has been vigorous with his home exercise program.  He notices that his swelling has significantly improved and he is able to ambulate out of the boot with minimal pain.   He has no new injury or complaints last being seen. Previous HPI (11-3-2021):                           Whitney Tapia is a 35 y.o. male works as a ,  referred by emergency room for evaluation and treatment of left ankle pain. 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 He had immediate pain and inability ambulate. He was taken to the emergency room with x-rays were performed and were negative for any type of fracture but he was placed in an Aircast and given crutches to limit his weightbearing. He was also to follow-up with orthopedics and is here today for his first visit with myself. He denies any prior left ankle injury or pain. He does however have a history of significant flatfoot bilaterally that does cause occasional foot issues. The pain's location is left lateral ankle ligament. he describes the symptoms as aching, sharp and stabbing. Symptoms improve with rest. Symptoms worsen with ankle plantarflexion/dorsiflexion, weight bearing (especially stair climbing), twisting activities. Patient states he denies having sensation of instability, decreased ROM    Treatment to date has been ice, NSAID, bracing without significant relief. Patient denies prior injury to ankle, denies numbness, tingling, fever, chills. Is affecting ADLs. Pain is 8/10 at it's worst.    Outside reports reviewed: Emergency room note and imaging reviewed    Patient's medications, allergies, past medical, surgical, social and family histories were reviewed and updated as appropriate. MA HPI: Patient is a 41year old male. Patient is in the office today with left ankle injury. 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 Patient states that the injury happened 10/27/21. He is currently wearing an aircast and ambulating with crutches.  Pain scale  8/10. His pain started out on the lateral side of his ankle and has progressed to the posterior. He complains of aching pain at night. He denies any previous injuries, surgeries or injections to his left ankle. Occupation: [employer] History  Patient's medications, allergies, past medical, surgical, social and family histories were reviewed and updated as appropriate. No past medical history on file. No past surgical history on file. Family History   Problem Relation Age of Onset    Depression Mother     Diabetes Father     High Blood Pressure Father     Heart Disease Father     Glaucoma Father     Asthma Brother      Social History     Socioeconomic History    Marital status: Single   Tobacco Use    Smoking status: Every Day     Packs/day: 0.50     Types: Cigarettes    Smokeless tobacco: Never   Substance and Sexual Activity    Alcohol use: Yes    Drug use: Not Currently     Types: Marijuana Jurline Sunitha)    Sexual activity: Yes     Partners: Female     Current Outpatient Medications   Medication Sig Dispense Refill    ibuprofen (ADVIL;MOTRIN) 800 MG tablet Take 1 tablet by mouth 3 times daily (with meals) 90 tablet 0    Blood Pressure Monitoring (BLOOD PRESSURE MONITOR/WRIST) AP Check BID 1 each 0    ibuprofen (ADVIL;MOTRIN) 600 MG tablet Take 1 tablet by mouth 3 times daily as needed for Pain 30 tablet 0    methylPREDNISolone (MEDROL DOSEPACK) 4 MG tablet Take by mouth. 1 kit 0     No current facility-administered medications for this visit. No Known Allergies      Review of Systems   Constitutional:  Negative for activity change, fatigue and fever. HENT:  Negative for hearing loss, sneezing and sore throat. Respiratory:  Negative for cough, shortness of breath and wheezing. Cardiovascular:  Negative for chest pain, palpitations and leg swelling. Gastrointestinal:  Negative for diarrhea, nausea and vomiting. Musculoskeletal:  Positive for arthralgias and gait problem.  Negative for back pain, joint swelling and myalgias. Skin:  Negative for color change, pallor, rash and wound. Neurological:  Negative for weakness, numbness and headaches. Psychiatric/Behavioral:  Negative for agitation and confusion. The patient is not hyperactive. Examination:  General Exam:  Vitals: BP (!) 148/104 (Site: Left Upper Arm, Position: Sitting, Cuff Size: Large Adult)   Pulse 60   SpO2 98%    Physical Exam  Constitutional:       General: He is not in acute distress. Appearance: Normal appearance. He is obese. He is not ill-appearing. Eyes:      General:         Right eye: No discharge. Left eye: No discharge. Extraocular Movements: Extraocular movements intact. Cardiovascular:      Rate and Rhythm: Normal rate. Pulmonary:      Effort: Pulmonary effort is normal. No respiratory distress. Breath sounds: No wheezing. Musculoskeletal:         General: Swelling, tenderness and deformity present. No signs of injury. Right knee: Normal.      Left knee: Normal.      Right lower leg: Normal. No edema. Left lower leg: Normal. No edema. Right ankle: Normal.      Right Achilles Tendon: Normal.      Left ankle: No swelling, deformity, ecchymosis or lacerations. No tenderness. No lateral malleolus, medial malleolus, ATF ligament, CF ligament or posterior TF ligament tenderness. Normal range of motion. Anterior drawer test negative. Normal pulse. Left Achilles Tendon: Normal.      Right foot: Normal.      Left foot: Normal range of motion and normal capillary refill. Swelling, deformity, tenderness, bony tenderness and crepitus present. No bunion, Charcot foot, foot drop, prominent metatarsal heads or laceration. Normal pulse. Skin:     General: Skin is warm and dry. Capillary Refill: Capillary refill takes less than 2 seconds. Neurological:      General: No focal deficit present.       Mental Status: He is alert and oriented to person, place, and time. Sensory: No sensory deficit. Coordination: Coordination normal.      Gait: Gait normal.   Psychiatric:         Mood and Affect: Mood normal.         Behavior: Behavior normal.      LEFT Foot and Ankle Exam      INSPECTION: ALIGNMENT:    Gait: Mildly antalgic    Alignment:     Hindfoot: Valgus deformity     Midfoot: Flat foot     Forefoot: Normal    Scars: None   Color: Normal     Swelling: None    Atrophy: None Collective     Heel / Toe Walking: No pain but difficult to perform    Ankle-Hindfoot Alignment:    Good -plantigrade (PG), well aligned      TENDERNESS:    Sinus tarsi: None   Anteromedial joint line: none    Syndesmosis: none   Anterolateral joint line: none    ATFL: None   talonavicular: none     CFL: None   anterior tibialis: none    Anterolateral gutter: none  Extensor tendons: none    Fibula: none    Peroneal tendons: none     Peroneal tubercle.  None     Medial/lateral achilles: none    Medial/lateral achilles insertion: none      Deltoid: none        Malleolus: none   Retrocalcaneal: none    PTT: none    Medial achilles: none    Navicular: +   Lateral achilles: none    Calcaneal tuberosity: none        Calcaneal cuboid: +   MT / MT heads: none     Navicular: +    Medial cord origin PF: none    Cuneiforms: none       Lisfranc none        Base of the fifth metatarsal: none     Tinels sign: neg        RANGE OF MOTION:  RIGHT   LEFT    STRENGTH: (Left) (* = pain)     Ankle DF/PF:    15/45    15/45    Anterior tibialis: 5/5    Eversion/Inversion:   15/25    15/25   Posterior tibialis: 5/5    Midfoot ABD/ADD:   10/10    10/10   Gastroc-soleus: 5/5    First MTP DF/PF:   60/25    60/25   Peroneals: 5/5     EHL: 5/5             FHL: 5/5        SPECIAL TESTS:    Anterior drawer:  Grade 1      (C-W contralateral side)     Inversion: 30°     Eversion 10°      Collective Instability: (Ant-post and varus-valgus)    Stable      PROVOCATIVE TESTING:    Forced DF/ER: No pain at syndesmosis. Mid-leg squeeze No pain at syndesmosis    Forced DF: No pain anterior joint line. Forced PF: No pain posterior ankle. Forced INV: No pain present laterally    Forced EV: No pain medial     Leys sign: Normal ankle plantar flexion. Resisted peroneal No subluxation or pain    1st-2nd MT toggle No pain at Lisfranc    MT-T torque No pain at Lisfranc      NEUROLOGIC TESTING:    All dermatomes foot, ankle and leg have normal sensation light touch    Ankle Reflexes 2+, symmetric     Negative Babinski and No Clonus      VASCULAR:  2+ pulses PT/DT with brisk capillary refill toes. Diagnostic testing:  X-ray images were reviewed by myself and discussed with the patient:  3 views of the left ankle in a skeletally mature patient demonstrates no acute osseous abnormalities. No changes compared to previous imaging. Ankle remains well aligned throughout. Patient has continued osteophyte formation at the anterior navicular and calcaneus with flatfoot deformity. Ankle mortise remains well-positioned. Appropriate tib-fib overlap and clear space remains. Previous imaging:    3 views of left ankle in a skeletally mature patient shows no acute osseous abnormalities. Patient does have mild generative changes at the left ankle joint and significant flatfoot deformity. He also has several osteophytes including at the calcaneus. Ankle mortise is well-positioned with symmetric joint space throughout. Appropriate tib-fib overlap and clear space seen. Office Procedures:  No orders of the defined types were placed in this encounter. Assessment and Plan    A: Left foot OA    P:   I had a thorough conversation with the patient regarding his foot imaging as well as his treatment course. I explained that this issue that he has is more chronic in nature not related to the previous injury of the ankle.   I explained and his significant arthritic changes as well as deformity of the foot at this time I would recommend being seen by specialist.  He was given a podiatry referral I did discuss with him proper supportive shoe wear but likely that they will discuss this further with him. I am happy to see him for any additional issues otherwise I will be seeing him as needed. Patient can be weightbearing and range of motion as tolerated but I did advise him to avoid any type of high-impact activity. I also explained that he should continue using ice and over-the-counter pain medication for pain control. All questions were answered and patient voices understanding.         Electronically signed by Megan Irvin DO on 9/28/2022 at 4:47 PM

## 2022-09-28 NOTE — PROGRESS NOTES
Pt is here for increased ankle swelling. Pt states no pain today but pain comes and goes and when he does have pain he can't walk at all. Pt states it's been going on since PT stopped.

## 2022-10-25 ENCOUNTER — OFFICE VISIT (OUTPATIENT)
Dept: FAMILY MEDICINE CLINIC | Age: 42
End: 2022-10-25
Payer: COMMERCIAL

## 2022-10-25 DIAGNOSIS — F17.200 TOBACCO USE DISORDER: ICD-10-CM

## 2022-10-25 DIAGNOSIS — R03.0 ELEVATED BLOOD PRESSURE READING: Primary | ICD-10-CM

## 2022-10-25 PROCEDURE — G8417 CALC BMI ABV UP PARAM F/U: HCPCS | Performed by: FAMILY MEDICINE

## 2022-10-25 PROCEDURE — G8484 FLU IMMUNIZE NO ADMIN: HCPCS | Performed by: FAMILY MEDICINE

## 2022-10-25 PROCEDURE — 4004F PT TOBACCO SCREEN RCVD TLK: CPT | Performed by: FAMILY MEDICINE

## 2022-10-25 PROCEDURE — G8427 DOCREV CUR MEDS BY ELIG CLIN: HCPCS | Performed by: FAMILY MEDICINE

## 2022-10-25 PROCEDURE — 99203 OFFICE O/P NEW LOW 30 MIN: CPT | Performed by: FAMILY MEDICINE

## 2022-10-25 ASSESSMENT — PATIENT HEALTH QUESTIONNAIRE - PHQ9
1. LITTLE INTEREST OR PLEASURE IN DOING THINGS: 0
SUM OF ALL RESPONSES TO PHQ QUESTIONS 1-9: 0
SUM OF ALL RESPONSES TO PHQ QUESTIONS 1-9: 0
2. FEELING DOWN, DEPRESSED OR HOPELESS: 0
SUM OF ALL RESPONSES TO PHQ QUESTIONS 1-9: 0
SUM OF ALL RESPONSES TO PHQ9 QUESTIONS 1 & 2: 0
SUM OF ALL RESPONSES TO PHQ QUESTIONS 1-9: 0

## 2022-10-25 NOTE — PROGRESS NOTES
Mat Chow  1980  10/30/22    Chief Complaint   Patient presents with    New Patient     Patient here to establish care with PCP. 35 yrs old man with non PMH came today to establish with us, patient doing fine and has no complaints. History reviewed. No pertinent past medical history. History reviewed. No pertinent surgical history. Family History   Problem Relation Age of Onset    Depression Mother     Diabetes Father     High Blood Pressure Father     Heart Disease Father     Glaucoma Father     Asthma Brother      Social History     Socioeconomic History    Marital status: Single     Spouse name: Not on file    Number of children: Not on file    Years of education: Not on file    Highest education level: Not on file   Occupational History    Not on file   Tobacco Use    Smoking status: Every Day     Packs/day: 0.50     Types: Cigarettes    Smokeless tobacco: Never   Substance and Sexual Activity    Alcohol use: Yes    Drug use: Not Currently     Types: Marijuana Pauline Forte)    Sexual activity: Yes     Partners: Female   Other Topics Concern    Not on file   Social History Narrative    Not on file     Social Determinants of Health     Financial Resource Strain: Not on file   Food Insecurity: Not on file   Transportation Needs: Not on file   Physical Activity: Not on file   Stress: Not on file   Social Connections: Not on file   Intimate Partner Violence: Not on file   Housing Stability: Not on file       No Known Allergies  Current Outpatient Medications   Medication Sig Dispense Refill    Blood Pressure Monitoring (BLOOD PRESSURE MONITOR/WRIST) AP Check BID (Patient not taking: Reported on 10/25/2022) 1 each 0     No current facility-administered medications for this visit. Review of Systems   Constitutional:  Negative for activity change, appetite change, chills, fatigue and fever. HENT:  Negative for congestion, postnasal drip, sinus pressure and sore throat.     Respiratory: Negative for cough, chest tightness, shortness of breath and wheezing. Cardiovascular:  Negative for chest pain and leg swelling. Gastrointestinal:  Negative for abdominal pain, constipation and diarrhea. Genitourinary:  Negative for dysuria and frequency. Musculoskeletal:  Negative for back pain. Skin:  Negative for rash. Neurological:  Negative for dizziness, weakness and headaches. Psychiatric/Behavioral:  Negative for agitation, behavioral problems and sleep disturbance. The patient is not nervous/anxious. Lab Results   Component Value Date    WBC 6.7 01/29/2019    HGB 12.2 (L) 01/29/2019    HCT 40.4 (L) 01/29/2019    MCV 69.1 (L) 01/29/2019     01/29/2019     Lab Results   Component Value Date     01/29/2019    K 3.9 01/29/2019     01/29/2019    CO2 27 01/29/2019    BUN 13 01/29/2019    CREATININE 1.0 01/29/2019    GLUCOSE 103 (H) 01/29/2019    CALCIUM 9.4 01/29/2019    PROT 8.0 01/29/2019    LABALBU 4.4 01/29/2019    BILITOT 0.5 01/29/2019    ALKPHOS 62 01/29/2019    AST 24 01/29/2019    ALT 32 01/29/2019    LABGLOM >60 01/29/2019    GFRAA >60 01/29/2019     Lab Results   Component Value Date    CHOL 195 03/12/2019     Lab Results   Component Value Date    TRIG 61 03/12/2019     Lab Results   Component Value Date    HDL 91 (H) 03/12/2019     Lab Results   Component Value Date    LDLCALC 92 03/12/2019     Lab Results   Component Value Date    LABA1C 5.4 03/12/2019     Lab Results   Component Value Date    TSH 1.05 03/12/2019         BP (!) 138/92   Pulse 64   Ht 6' (1.829 m)   Wt (!) 306 lb 12.8 oz (139.2 kg)   SpO2 97%   BMI 41.61 kg/m²     BP Readings from Last 3 Encounters:   10/25/22 (!) 138/92   09/28/22 (!) 148/104   07/06/22 132/82       Wt Readings from Last 3 Encounters:   10/25/22 (!) 306 lb 12.8 oz (139.2 kg)   07/06/22 (!) 319 lb (144.7 kg)   06/06/22 (!) 330 lb (149.7 kg)         Physical Exam  Constitutional:       General: He is not in acute distress. Appearance: Normal appearance. He is well-developed. He is not ill-appearing or diaphoretic. HENT:      Head: Normocephalic and atraumatic. Right Ear: Tympanic membrane normal. There is no impacted cerumen. Left Ear: There is no impacted cerumen. Nose: Nose normal.   Eyes:      General: No scleral icterus. Pupils: Pupils are equal, round, and reactive to light. Cardiovascular:      Rate and Rhythm: Normal rate and regular rhythm. Heart sounds: Normal heart sounds. No murmur heard. Pulmonary:      Effort: Pulmonary effort is normal.      Breath sounds: Normal breath sounds. No wheezing. Abdominal:      General: There is no distension. Palpations: Abdomen is soft. There is no mass. Tenderness: There is no abdominal tenderness. Musculoskeletal:         General: Normal range of motion. Cervical back: Normal range of motion and neck supple. No rigidity. Right lower leg: No edema. Left lower leg: No edema. Neurological:      General: No focal deficit present. Mental Status: He is alert and oriented to person, place, and time. Cranial Nerves: No cranial nerve deficit. Psychiatric:         Mood and Affect: Mood normal.         Behavior: Behavior normal.         Thought Content: Thought content normal.         Judgment: Judgment normal.       ASSESSMENT/ PLAN:    1. Elevated blood pressure reading  - little elevated. Patient would like to work on the weight, and watch his diet, will recheck in 3 months, and going to check the reading at home    2. Tobacco use disorder  - encourage smoking cessation            - All old blood work reviewed with the patient  - Appropriate prescription are addressed. - After visit summery provided. - Questions answered and patient verbalizes understanding.  - Call for any problem, questions, or concerns. Return in about 3 months (around 1/25/2023).

## 2022-10-30 VITALS
OXYGEN SATURATION: 97 % | HEIGHT: 72 IN | DIASTOLIC BLOOD PRESSURE: 92 MMHG | HEART RATE: 64 BPM | BODY MASS INDEX: 41.55 KG/M2 | SYSTOLIC BLOOD PRESSURE: 138 MMHG | WEIGHT: 306.8 LBS

## 2022-10-30 PROBLEM — R03.0 ELEVATED BLOOD PRESSURE READING: Status: ACTIVE | Noted: 2022-10-30

## 2022-10-30 ASSESSMENT — ENCOUNTER SYMPTOMS
ABDOMINAL PAIN: 0
CONSTIPATION: 0
DIARRHEA: 0
COUGH: 0
BACK PAIN: 0
WHEEZING: 0
SORE THROAT: 0
SINUS PRESSURE: 0
SHORTNESS OF BREATH: 0
CHEST TIGHTNESS: 0

## 2022-11-29 NOTE — DISCHARGE SUMMARY
Outpatient Physical Therapy           [place]           [x] Phone: [phone]   Fax: [phone]  [place]           [] Phone: [phone]   Fax: [phone]      To:  Dr. Dominik Fleming   From: Jessee Mc, PT, PT     Patient: Ceasar Muñoz                  : 1980  Diagnosis:    Diagnosis: Sprain of Anterior Talofibular ligament of left ankle     Date: 2022  Treatment Diagnosis:   Treatment Diagnosis: Sprain of anterior talofibular ligament of left ankle;  Left ankle pain; abnormalities of gait and mobility      []  Progress Note                [x]  Discharge Note      Pt never returned after note below on 3/3/22, will dc at this time    Evaluation Date:  22  Total Visits to date:  11  Cancels/No-shows to date:      Subjective:    Andres Pop states that he has seen improvement with physical therapy. He reports that his strength is improving, however not near where it needs to be. He is able to work without a brace and walking has improved. However, he still experiences fatigue and discomfort at work and with walking. He experiences increased pain at the end of the workday. He is still unable to run and hop, which prevents him from working out and boxing.     Plan of Care/Treatment to date:  [x] Therapeutic Exercise    [x] Modalities:  [x] Therapeutic Activity     [] Ultrasound  [] Electrical Stimulation  [x] Gait Training      [] Cervical Traction   [] Lumbar Traction  [x] Neuromuscular Re-education  [x] Cold/hotpack [] Iontophoresis  [x] Instruction in HEP      Other:  [x] Manual Therapy       [x]  Vasopneumatic  [] Aquatic Therapy       []   Dry Needle Therapy                      Objective/Significant Findings At Last Visit/Comments:    LLE AROM : Exceptions  L Ankle Dorsiflexion 0-20: 12  L Ankle Plantar Flexion 0-45: 50  L Ankle Forefoot Inversion 0-40:  30  L Ankle Forefoot Eversion 0-20: 15     Strength LLE  Strength LLE: Exception  L Ankle Dorsiflexion: 5/5  L Ankle Plantar Flexion: 4+/5  L Ankle Inversion: 5/5  L Ankle Eversion: 5/5     SLS: 4 seconds, instability at the ankle     Able to perform DL heel raise        Assessment:     Progress Note  Paz Hawley continues to work towards his goals in physical therapy. Patient has met most of his short term goals at this time and will now focus on long term goals. Patient has made good objective findings including improved L ankle AROM, improved L ankle strength and improved gait. However, patient continues to present with decreased L ankle stability with SLS. Patient also continues to experience increased fatigue in the L ankle and increased pain towards the end of the day. Patient will continue to benefit from skilled therapeutic intervention in this setting to decrease pain and improve overall functional mobility. Goal Status:  [] Achieved [x] Partially Achieved  [] Not Achieved     Changes to goals:    Patient goals : Improve strength, improve walking, return to working out  Short term goals  Time Frame for Short term goals: 4 weeks  Short term goal 1: Patient will report compliance with HEP. Met  Short term goal 2: Patient will present with improved L ankle AROM to 30 degrees plantarflexion, 12 degrees dorsiflexion, 25 degrees inversion, and 20 degrees eversion. Met  Short term goal 3: Patient will report a decrease in L ankle pain at the end of the work day from 7/10 to 3/10. Ongoing  5/10  Short term goal 4: Patient will present with improved gait pattern with improved toe off. Ongoing- improving        Long term goals  Time Frame for Long term goals : 10 weeks  Long term goal 1: Patient will present with improved L ankle AROM to WNL. Long term goal 2: Patient will present with improved L ankle strength to 5/5 globally. Long term goal 3: Patient will present with normalized gait pattern and ability to perform a SLS for > 20 seconds.   Long term goal 4: Patient will report an improved ability to complete a 12 hour shift without pain. Frequency/Duration:  # Days per week: [] 1 day # Weeks: [] 1 week [] 4 weeks [] 8 weeks     [x] 2 days   [] 2 weeks [] 5 weeks [] 10 weeks     [] 3 days   [] 3 weeks [x] 6 weeks [] 12 weeks       Rehab Potential: [] Excellent [x] Good [] Fair  [] Poor         Patient Status: [] Continue per initial plan of Care     [x] Patient now discharged     [] Additional visits requested, Please re-certify for additional visits:      Requested frequency/duration:       If we are requesting more visits, we fully anticipate the patient's condition is expected to improve within the treatment timeframe we are requesting. Electronically signed by:  EDIN Lynch, ATC   11/29/2022, 4:09 PM    If you have any questions or concerns, please don't hesitate to call.   Thank you for your referral.    Physician Signature:______________________ Date:______ Time: ________  By signing above, therapists plan is approved by physician

## 2023-01-03 ENCOUNTER — HOSPITAL ENCOUNTER (OUTPATIENT)
Dept: MRI IMAGING | Age: 43
Discharge: HOME | End: 2023-01-03
Payer: COMMERCIAL

## 2023-01-03 DIAGNOSIS — M79.672 LEFT FOOT PAIN: ICD-10-CM

## 2023-01-03 PROCEDURE — 73721 MRI JNT OF LWR EXTRE W/O DYE: CPT
